# Patient Record
Sex: FEMALE | Race: WHITE | NOT HISPANIC OR LATINO | Employment: UNEMPLOYED | URBAN - METROPOLITAN AREA
[De-identification: names, ages, dates, MRNs, and addresses within clinical notes are randomized per-mention and may not be internally consistent; named-entity substitution may affect disease eponyms.]

---

## 2020-10-21 ENCOUNTER — HOSPITAL ENCOUNTER (EMERGENCY)
Facility: HOSPITAL | Age: 35
Discharge: HOME | End: 2020-10-21
Attending: EMERGENCY MEDICINE
Payer: MEDICAID

## 2020-10-21 VITALS
DIASTOLIC BLOOD PRESSURE: 77 MMHG | TEMPERATURE: 98 F | SYSTOLIC BLOOD PRESSURE: 143 MMHG | BODY MASS INDEX: 39.68 KG/M2 | OXYGEN SATURATION: 98 % | RESPIRATION RATE: 18 BRPM | WEIGHT: 293 LBS | HEIGHT: 72 IN | HEART RATE: 91 BPM
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
## 2020-10-21 DIAGNOSIS — S39.012A STRAIN OF LUMBAR REGION, INITIAL ENCOUNTER: ICD-10-CM

## 2020-10-21 PROCEDURE — 99284 EMERGENCY DEPT VISIT MOD MDM: CPT

## 2020-10-21 RX ORDER — METHOCARBAMOL 500 MG/1
1000 TABLET, FILM COATED ORAL 3 TIMES DAILY
Qty: 30 TABLET | Refills: 0 | Status: SHIPPED | OUTPATIENT
Start: 2020-10-21 | End: 2020-10-26

## 2020-10-21 RX ORDER — DICLOFENAC SODIUM 50 MG/1
50 TABLET, DELAYED RELEASE ORAL 2 TIMES DAILY
Qty: 20 TABLET | Refills: 0 | Status: SHIPPED | OUTPATIENT
Start: 2020-10-21 | End: 2023-10-02

## 2020-10-21 NOTE — Clinical Note
"Violet"Chilo Martinez was seen and treated in our emergency department on 10/21/2020.  She may return to work on 10/23/2020.       If you have any questions or concerns, please don't hesitate to call.      Viet Ocampo Jr., FNP"
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
## 2022-01-04 ENCOUNTER — PATIENT OUTREACH (OUTPATIENT)
Dept: ADMINISTRATIVE | Facility: HOSPITAL | Age: 37
End: 2022-01-04
Payer: MEDICAID

## 2023-09-11 ENCOUNTER — OFFICE VISIT (OUTPATIENT)
Dept: PRIMARY CARE CLINIC | Facility: CLINIC | Age: 38
End: 2023-09-11
Payer: MEDICAID

## 2023-09-11 ENCOUNTER — LAB VISIT (OUTPATIENT)
Dept: LAB | Facility: HOSPITAL | Age: 38
End: 2023-09-11
Attending: NURSE PRACTITIONER
Payer: MEDICAID

## 2023-09-11 VITALS
BODY MASS INDEX: 42.33 KG/M2 | SYSTOLIC BLOOD PRESSURE: 124 MMHG | HEART RATE: 85 BPM | WEIGHT: 263.38 LBS | HEIGHT: 66 IN | DIASTOLIC BLOOD PRESSURE: 78 MMHG | TEMPERATURE: 98 F | OXYGEN SATURATION: 98 %

## 2023-09-11 DIAGNOSIS — Z78.9 PROFOUND INATTENTION: ICD-10-CM

## 2023-09-11 DIAGNOSIS — Z13.220 ENCOUNTER FOR LIPID SCREENING FOR CARDIOVASCULAR DISEASE: ICD-10-CM

## 2023-09-11 DIAGNOSIS — Z86.39 HISTORY OF METABOLIC AND NUTRITIONAL DISORDER: ICD-10-CM

## 2023-09-11 DIAGNOSIS — R21 SKIN ERUPTION: Primary | ICD-10-CM

## 2023-09-11 DIAGNOSIS — Z13.6 ENCOUNTER FOR LIPID SCREENING FOR CARDIOVASCULAR DISEASE: ICD-10-CM

## 2023-09-11 DIAGNOSIS — Z11.4 SCREENING FOR HIV (HUMAN IMMUNODEFICIENCY VIRUS): ICD-10-CM

## 2023-09-11 DIAGNOSIS — Z11.59 ENCOUNTER FOR HEPATITIS C SCREENING TEST FOR LOW RISK PATIENT: ICD-10-CM

## 2023-09-11 DIAGNOSIS — Z00.00 GENERAL MEDICAL EXAM: ICD-10-CM

## 2023-09-11 DIAGNOSIS — Z12.4 SCREENING FOR CERVICAL CANCER: ICD-10-CM

## 2023-09-11 LAB
ALBUMIN SERPL BCP-MCNC: 3.7 G/DL (ref 3.5–5.2)
ALP SERPL-CCNC: 57 U/L (ref 55–135)
ALT SERPL W/O P-5'-P-CCNC: 8 U/L (ref 10–44)
ANION GAP SERPL CALC-SCNC: 12 MMOL/L (ref 8–16)
AST SERPL-CCNC: 13 U/L (ref 10–40)
BASOPHILS # BLD AUTO: 0.05 K/UL (ref 0–0.2)
BASOPHILS NFR BLD: 0.4 % (ref 0–1.9)
BILIRUB SERPL-MCNC: 0.6 MG/DL (ref 0.1–1)
BUN SERPL-MCNC: 10 MG/DL (ref 6–20)
CALCIUM SERPL-MCNC: 9.5 MG/DL (ref 8.7–10.5)
CHLORIDE SERPL-SCNC: 108 MMOL/L (ref 95–110)
CHOLEST SERPL-MCNC: 187 MG/DL (ref 120–199)
CHOLEST/HDLC SERPL: 4.1 {RATIO} (ref 2–5)
CO2 SERPL-SCNC: 18 MMOL/L (ref 23–29)
CREAT SERPL-MCNC: 0.7 MG/DL (ref 0.5–1.4)
DIFFERENTIAL METHOD: ABNORMAL
EOSINOPHIL # BLD AUTO: 0.1 K/UL (ref 0–0.5)
EOSINOPHIL NFR BLD: 1 % (ref 0–8)
ERYTHROCYTE [DISTWIDTH] IN BLOOD BY AUTOMATED COUNT: 15.9 % (ref 11.5–14.5)
EST. GFR  (NO RACE VARIABLE): >60 ML/MIN/1.73 M^2
ESTIMATED AVG GLUCOSE: 100 MG/DL (ref 68–131)
GLUCOSE SERPL-MCNC: 76 MG/DL (ref 70–110)
HBA1C MFR BLD: 5.1 % (ref 4–5.6)
HCT VFR BLD AUTO: 38.9 % (ref 37–48.5)
HCV AB SERPL QL IA: NORMAL
HDLC SERPL-MCNC: 46 MG/DL (ref 40–75)
HDLC SERPL: 24.6 % (ref 20–50)
HGB BLD-MCNC: 12.5 G/DL (ref 12–16)
HIV 1+2 AB+HIV1 P24 AG SERPL QL IA: NORMAL
IMM GRANULOCYTES # BLD AUTO: 0.03 K/UL (ref 0–0.04)
IMM GRANULOCYTES NFR BLD AUTO: 0.2 % (ref 0–0.5)
LDLC SERPL CALC-MCNC: 125.6 MG/DL (ref 63–159)
LYMPHOCYTES # BLD AUTO: 3.6 K/UL (ref 1–4.8)
LYMPHOCYTES NFR BLD: 29.1 % (ref 18–48)
MCH RBC QN AUTO: 27.1 PG (ref 27–31)
MCHC RBC AUTO-ENTMCNC: 32.1 G/DL (ref 32–36)
MCV RBC AUTO: 84 FL (ref 82–98)
MONOCYTES # BLD AUTO: 1 K/UL (ref 0.3–1)
MONOCYTES NFR BLD: 8.3 % (ref 4–15)
NEUTROPHILS # BLD AUTO: 7.6 K/UL (ref 1.8–7.7)
NEUTROPHILS NFR BLD: 61 % (ref 38–73)
NONHDLC SERPL-MCNC: 141 MG/DL
NRBC BLD-RTO: 0 /100 WBC
PLATELET # BLD AUTO: 294 K/UL (ref 150–450)
PMV BLD AUTO: 10.9 FL (ref 9.2–12.9)
POTASSIUM SERPL-SCNC: 3.6 MMOL/L (ref 3.5–5.1)
PROT SERPL-MCNC: 8.1 G/DL (ref 6–8.4)
RBC # BLD AUTO: 4.62 M/UL (ref 4–5.4)
SODIUM SERPL-SCNC: 138 MMOL/L (ref 136–145)
T3FREE SERPL-MCNC: 3 PG/ML (ref 2.3–4.2)
T4 FREE SERPL-MCNC: 0.96 NG/DL (ref 0.71–1.51)
TRIGL SERPL-MCNC: 77 MG/DL (ref 30–150)
TSH SERPL DL<=0.005 MIU/L-ACNC: 3.12 UIU/ML (ref 0.4–4)
WBC # BLD AUTO: 12.38 K/UL (ref 3.9–12.7)

## 2023-09-11 PROCEDURE — 84443 ASSAY THYROID STIM HORMONE: CPT | Performed by: NURSE PRACTITIONER

## 2023-09-11 PROCEDURE — 3044F PR MOST RECENT HEMOGLOBIN A1C LEVEL <7.0%: ICD-10-PCS | Mod: CPTII,,, | Performed by: NURSE PRACTITIONER

## 2023-09-11 PROCEDURE — 3074F SYST BP LT 130 MM HG: CPT | Mod: CPTII,,, | Performed by: NURSE PRACTITIONER

## 2023-09-11 PROCEDURE — 1159F MED LIST DOCD IN RCRD: CPT | Mod: CPTII,,, | Performed by: NURSE PRACTITIONER

## 2023-09-11 PROCEDURE — 86803 HEPATITIS C AB TEST: CPT | Performed by: NURSE PRACTITIONER

## 2023-09-11 PROCEDURE — 3008F PR BODY MASS INDEX (BMI) DOCUMENTED: ICD-10-PCS | Mod: CPTII,,, | Performed by: NURSE PRACTITIONER

## 2023-09-11 PROCEDURE — 3074F PR MOST RECENT SYSTOLIC BLOOD PRESSURE < 130 MM HG: ICD-10-PCS | Mod: CPTII,,, | Performed by: NURSE PRACTITIONER

## 2023-09-11 PROCEDURE — 99214 OFFICE O/P EST MOD 30 MIN: CPT | Mod: PBBFAC,PN | Performed by: NURSE PRACTITIONER

## 2023-09-11 PROCEDURE — 3078F PR MOST RECENT DIASTOLIC BLOOD PRESSURE < 80 MM HG: ICD-10-PCS | Mod: CPTII,,, | Performed by: NURSE PRACTITIONER

## 2023-09-11 PROCEDURE — 84439 ASSAY OF FREE THYROXINE: CPT | Performed by: NURSE PRACTITIONER

## 2023-09-11 PROCEDURE — 3078F DIAST BP <80 MM HG: CPT | Mod: CPTII,,, | Performed by: NURSE PRACTITIONER

## 2023-09-11 PROCEDURE — 1159F PR MEDICATION LIST DOCUMENTED IN MEDICAL RECORD: ICD-10-PCS | Mod: CPTII,,, | Performed by: NURSE PRACTITIONER

## 2023-09-11 PROCEDURE — 80053 COMPREHEN METABOLIC PANEL: CPT | Performed by: NURSE PRACTITIONER

## 2023-09-11 PROCEDURE — 85025 COMPLETE CBC W/AUTO DIFF WBC: CPT | Performed by: NURSE PRACTITIONER

## 2023-09-11 PROCEDURE — 84481 FREE ASSAY (FT-3): CPT | Performed by: NURSE PRACTITIONER

## 2023-09-11 PROCEDURE — 80061 LIPID PANEL: CPT | Performed by: NURSE PRACTITIONER

## 2023-09-11 PROCEDURE — 1160F RVW MEDS BY RX/DR IN RCRD: CPT | Mod: CPTII,,, | Performed by: NURSE PRACTITIONER

## 2023-09-11 PROCEDURE — 3044F HG A1C LEVEL LT 7.0%: CPT | Mod: CPTII,,, | Performed by: NURSE PRACTITIONER

## 2023-09-11 PROCEDURE — 87389 HIV-1 AG W/HIV-1&-2 AB AG IA: CPT | Performed by: NURSE PRACTITIONER

## 2023-09-11 PROCEDURE — 99203 PR OFFICE/OUTPT VISIT, NEW, LEVL III, 30-44 MIN: ICD-10-PCS | Mod: S$PBB,,, | Performed by: NURSE PRACTITIONER

## 2023-09-11 PROCEDURE — 83036 HEMOGLOBIN GLYCOSYLATED A1C: CPT | Performed by: NURSE PRACTITIONER

## 2023-09-11 PROCEDURE — 36415 COLL VENOUS BLD VENIPUNCTURE: CPT | Mod: PN | Performed by: NURSE PRACTITIONER

## 2023-09-11 PROCEDURE — 1160F PR REVIEW ALL MEDS BY PRESCRIBER/CLIN PHARMACIST DOCUMENTED: ICD-10-PCS | Mod: CPTII,,, | Performed by: NURSE PRACTITIONER

## 2023-09-11 PROCEDURE — 99999 PR PBB SHADOW E&M-EST. PATIENT-LVL IV: CPT | Mod: PBBFAC,,, | Performed by: NURSE PRACTITIONER

## 2023-09-11 PROCEDURE — 3008F BODY MASS INDEX DOCD: CPT | Mod: CPTII,,, | Performed by: NURSE PRACTITIONER

## 2023-09-11 PROCEDURE — 99999 PR PBB SHADOW E&M-EST. PATIENT-LVL IV: ICD-10-PCS | Mod: PBBFAC,,, | Performed by: NURSE PRACTITIONER

## 2023-09-11 PROCEDURE — 99203 OFFICE O/P NEW LOW 30 MIN: CPT | Mod: S$PBB,,, | Performed by: NURSE PRACTITIONER

## 2023-09-11 NOTE — PROGRESS NOTES
Subjective:       Patient ID: Violet Martinez is a 37 y.o. female.    Chief Complaint: Annual Exam and Establish Care      History of Present Illness:   Violet Martinez 37 y.o. female presents today with reports of skin eruption to her legs and address care gaps. Patient denies any other problems or concerns at this time. Treatment options and alternatives were discussed with the patient. Patient provided opportunity to ask additional questions.  All questions were answered. Voices understanding and acceptance of this advice. Instructed to call back if any further questions or concerns.      History reviewed. No pertinent past medical history.  Family History   Problem Relation Age of Onset    Hypertension Mother      Social History     Socioeconomic History    Marital status: Single   Tobacco Use    Smoking status: Never    Smokeless tobacco: Current    Tobacco comments:     vape   Substance and Sexual Activity    Alcohol use: Yes    Drug use: Yes     Types: Marijuana    Sexual activity: Yes     Outpatient Encounter Medications as of 9/11/2023   Medication Sig Dispense Refill    diclofenac (VOLTAREN) 50 MG EC tablet Take 1 tablet (50 mg total) by mouth 2 (two) times daily. (Patient not taking: Reported on 9/11/2023) 20 tablet 0     No facility-administered encounter medications on file as of 9/11/2023.       Review of Systems   Constitutional:  Negative for appetite change, chills and fever.   HENT:  Negative for ear pain, sinus pressure, sore throat and trouble swallowing.    Eyes:  Negative for visual disturbance.   Respiratory:  Negative for shortness of breath.    Cardiovascular:  Negative for chest pain.   Gastrointestinal:  Negative for abdominal pain, diarrhea, nausea and vomiting.   Endocrine: Negative for cold intolerance, polyphagia and polyuria.   Genitourinary:  Negative for decreased urine volume and dysuria.   Musculoskeletal:  Negative for back pain.   Skin:  Negative for rash.  "  Allergic/Immunologic: Negative for environmental allergies and food allergies.   Neurological:  Negative for dizziness, tremors, weakness and numbness.   Hematological:  Does not bruise/bleed easily.   Psychiatric/Behavioral:  Negative for confusion and hallucinations. The patient is not nervous/anxious and is not hyperactive.    All other systems reviewed and are negative.      Objective:      /78 (BP Location: Left arm, Patient Position: Sitting, BP Method: Large (Manual))   Pulse 85   Temp 98.3 °F (36.8 °C) (Oral)   Ht 5' 6" (1.676 m)   Wt 119.5 kg (263 lb 6.4 oz)   LMP 08/22/2023   SpO2 98%   BMI 42.51 kg/m²   Physical Exam  Vitals and nursing note reviewed.   Constitutional:       Appearance: She is well-developed.   HENT:      Head: Normocephalic and atraumatic.      Right Ear: External ear normal.      Left Ear: External ear normal.      Nose: Nose normal.   Eyes:      Conjunctiva/sclera: Conjunctivae normal.      Pupils: Pupils are equal, round, and reactive to light.   Cardiovascular:      Rate and Rhythm: Normal rate and regular rhythm.      Heart sounds: Normal heart sounds. No murmur heard.  Pulmonary:      Effort: Pulmonary effort is normal.      Breath sounds: Normal breath sounds. No wheezing.   Abdominal:      General: Bowel sounds are normal.      Palpations: Abdomen is soft.      Tenderness: There is no abdominal tenderness.   Musculoskeletal:         General: Normal range of motion.      Cervical back: Normal range of motion and neck supple.   Skin:     General: Skin is warm and dry.      Findings: No rash.             Comments: See images    Neurological:      Mental Status: She is alert and oriented to person, place, and time.      Deep Tendon Reflexes: Reflexes are normal and symmetric.   Psychiatric:         Behavior: Behavior normal.         Thought Content: Thought content normal.         Judgment: Judgment normal.               No results found for this or any previous " visit.  Assessment:       1. Skin eruption    2. General medical exam    3. Profound inattention    4. Screening for HIV (human immunodeficiency virus)    5. Screening for cervical cancer    6. History of metabolic and nutritional disorder    7. Encounter for hepatitis C screening test for low risk patient    8. Encounter for lipid screening for cardiovascular disease        Plan:   Violet was seen today for annual exam and establish care.    Diagnoses and all orders for this visit:    Skin eruption    General medical exam  -     CBC Auto Differential; Future  -     Comprehensive Metabolic Panel; Future    Profound inattention    Screening for HIV (human immunodeficiency virus)  -     HIV 1/2 Ag/Ab (4th Gen); Future    Screening for cervical cancer  -     Ambulatory referral/consult to Gynecology; Future    History of metabolic and nutritional disorder  -     Hemoglobin A1C; Future  -     TSH; Future  -     T3, Free; Future  -     T4, Free; Future    Encounter for hepatitis C screening test for low risk patient  -     Hepatitis C Antibody; Future    Encounter for lipid screening for cardiovascular disease  -     Lipid Panel; Future              [facility] [place]   [address] [place] [address] [address]  [place] [address]  Office [phone]  Fax [phone]      Yes

## 2023-09-13 ENCOUNTER — PATIENT MESSAGE (OUTPATIENT)
Dept: PRIMARY CARE CLINIC | Facility: CLINIC | Age: 38
End: 2023-09-13
Payer: MEDICAID

## 2023-09-15 ENCOUNTER — OFFICE VISIT (OUTPATIENT)
Dept: PRIMARY CARE CLINIC | Facility: CLINIC | Age: 38
End: 2023-09-15
Payer: MEDICAID

## 2023-09-15 DIAGNOSIS — G47.30 SLEEP APNEA, UNSPECIFIED TYPE: Primary | ICD-10-CM

## 2023-09-15 PROCEDURE — 3044F HG A1C LEVEL LT 7.0%: CPT | Mod: CPTII,95,, | Performed by: NURSE PRACTITIONER

## 2023-09-15 PROCEDURE — 99213 PR OFFICE/OUTPT VISIT, EST, LEVL III, 20-29 MIN: ICD-10-PCS | Mod: 95,,, | Performed by: NURSE PRACTITIONER

## 2023-09-15 PROCEDURE — 99213 OFFICE O/P EST LOW 20 MIN: CPT | Mod: 95,,, | Performed by: NURSE PRACTITIONER

## 2023-09-15 PROCEDURE — 3044F PR MOST RECENT HEMOGLOBIN A1C LEVEL <7.0%: ICD-10-PCS | Mod: CPTII,95,, | Performed by: NURSE PRACTITIONER

## 2023-09-15 PROCEDURE — 1159F PR MEDICATION LIST DOCUMENTED IN MEDICAL RECORD: ICD-10-PCS | Mod: CPTII,95,, | Performed by: NURSE PRACTITIONER

## 2023-09-15 PROCEDURE — 1159F MED LIST DOCD IN RCRD: CPT | Mod: CPTII,95,, | Performed by: NURSE PRACTITIONER

## 2023-09-15 PROCEDURE — 1160F PR REVIEW ALL MEDS BY PRESCRIBER/CLIN PHARMACIST DOCUMENTED: ICD-10-PCS | Mod: CPTII,95,, | Performed by: NURSE PRACTITIONER

## 2023-09-15 PROCEDURE — 1160F RVW MEDS BY RX/DR IN RCRD: CPT | Mod: CPTII,95,, | Performed by: NURSE PRACTITIONER
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
## 2023-09-21 ENCOUNTER — PATIENT MESSAGE (OUTPATIENT)
Dept: PRIMARY CARE CLINIC | Facility: CLINIC | Age: 38
End: 2023-09-21
Payer: MEDICAID

## 2023-09-28 ENCOUNTER — PATIENT MESSAGE (OUTPATIENT)
Dept: PULMONOLOGY | Facility: CLINIC | Age: 38
End: 2023-09-28
Payer: MEDICAID

## 2023-10-02 ENCOUNTER — OFFICE VISIT (OUTPATIENT)
Dept: PULMONOLOGY | Facility: CLINIC | Age: 38
End: 2023-10-02
Payer: MEDICAID

## 2023-10-02 ENCOUNTER — PATIENT MESSAGE (OUTPATIENT)
Dept: PULMONOLOGY | Facility: CLINIC | Age: 38
End: 2023-10-02

## 2023-10-02 VITALS — WEIGHT: 260 LBS | BODY MASS INDEX: 41.78 KG/M2 | HEIGHT: 66 IN

## 2023-10-02 DIAGNOSIS — G47.33 OSA (OBSTRUCTIVE SLEEP APNEA): Primary | ICD-10-CM

## 2023-10-02 DIAGNOSIS — G47.19 EXCESSIVE DAYTIME SLEEPINESS: ICD-10-CM

## 2023-10-02 DIAGNOSIS — I10 PRIMARY HYPERTENSION: ICD-10-CM

## 2023-10-02 DIAGNOSIS — G47.30 SLEEP APNEA, UNSPECIFIED TYPE: ICD-10-CM

## 2023-10-02 PROCEDURE — 1160F RVW MEDS BY RX/DR IN RCRD: CPT | Mod: CPTII,95,, | Performed by: INTERNAL MEDICINE

## 2023-10-02 PROCEDURE — 3008F PR BODY MASS INDEX (BMI) DOCUMENTED: ICD-10-PCS | Mod: CPTII,95,, | Performed by: INTERNAL MEDICINE

## 2023-10-02 PROCEDURE — 1159F PR MEDICATION LIST DOCUMENTED IN MEDICAL RECORD: ICD-10-PCS | Mod: CPTII,95,, | Performed by: INTERNAL MEDICINE

## 2023-10-02 PROCEDURE — 1160F PR REVIEW ALL MEDS BY PRESCRIBER/CLIN PHARMACIST DOCUMENTED: ICD-10-PCS | Mod: CPTII,95,, | Performed by: INTERNAL MEDICINE

## 2023-10-02 PROCEDURE — 99203 PR OFFICE/OUTPT VISIT, NEW, LEVL III, 30-44 MIN: ICD-10-PCS | Mod: 95,,, | Performed by: INTERNAL MEDICINE

## 2023-10-02 PROCEDURE — 99203 OFFICE O/P NEW LOW 30 MIN: CPT | Mod: 95,,, | Performed by: INTERNAL MEDICINE

## 2023-10-02 PROCEDURE — 3008F BODY MASS INDEX DOCD: CPT | Mod: CPTII,95,, | Performed by: INTERNAL MEDICINE

## 2023-10-02 PROCEDURE — 3044F HG A1C LEVEL LT 7.0%: CPT | Mod: CPTII,95,, | Performed by: INTERNAL MEDICINE

## 2023-10-02 PROCEDURE — 3044F PR MOST RECENT HEMOGLOBIN A1C LEVEL <7.0%: ICD-10-PCS | Mod: CPTII,95,, | Performed by: INTERNAL MEDICINE

## 2023-10-02 PROCEDURE — 1159F MED LIST DOCD IN RCRD: CPT | Mod: CPTII,95,, | Performed by: INTERNAL MEDICINE

## 2023-10-02 NOTE — PATIENT INSTRUCTIONS
Your provider has scheduled you for a sleep study.   You should be receiving a phone call from the sleep lab shortly after your study has been approved by your insurance. Please make sure you have your current phone numbers in the [email or web address] system. If you do not hear from anyone in the next 10 business days, please call the sleep lab at [phone] to schedule your sleep study. The sleep studies are performed at [facility] seven nights a week.  When you are scheduling your sleep study, they will also make you a follow up appointment with your provider. This follow up appointment will be 10-14 days after your sleep study to review the results. If it is noted that you do have sleep apnea on your initial sleep study, you may receive a call back for a second night study with the CPAP before you come back to the office.

## 2023-10-02 NOTE — ASSESSMENT & PLAN NOTE
Known JHON since 20 years old  CPAP has failure: mechanical breakdown  Irregular bed and wake time    INLAB PSG die to poor schedule

## 2023-10-02 NOTE — ASSESSMENT & PLAN NOTE
Patient would benefit from weight loss and has tried to set realistic goals to achieve success. Lifestyle changes were discussed on eating healthy, exercising at least 150 minutes weekly, and reducing sedentary behavior.   Discussed the risk factors associated with obesity: Arthritis/JHON/Diabetes/Fatty Liver/Cardiovascular disease/GERD/HTN/HLP.

## 2023-10-02 NOTE — PROGRESS NOTES
The patient location is: [facility]  The chief complaint leading to consultation is: Sleep issues    Visit type: audiovisual    Face to Face time with patient: 8.22min  45 minutes of total time spent on the encounter, which includes face to face time and non-face to face time preparing to see the patient (eg, review of tests), Obtaining and/or reviewing separately obtained history, Documenting clinical information in the electronic or other health record, Independently interpreting results (not separately reported) and communicating results to the patient/family/caregiver, or Care coordination (not separately reported).         Each patient to whom he or she provides medical services by telemedicine is:  (1) informed of the relationship between the physician and patient and the respective role of any other health care provider with respect to management of the patient; and (2) notified that he or she may decline to receive medical services by telemedicine and may withdraw from such care at any time.    Notes:                                           Pulmonary Outpatient  Visit     Subjective:       Patient ID: Violet Martinez is a 37 y.o. female.    Social History     Tobacco Use   Smoking Status Never   Smokeless Tobacco Current   Tobacco Comments    vape            Chief Complaint: Apnea      Violet Martinez is 37 y.o.  New patient  Referred by Janette Mccall PA  Known JHON  Has CPAP: broken since 3 weeks  Duration of therapy: had since 20 years old  Test was done at [facility]:  DME[facility]  CPAP ? 20 , Nasal mask  Occupation: unemployed  Bed time: 10 pm  Wake time: 5 am  Sleep poorly  Daytime sleepiness and naps  Sleeping Pills: melatonin  SmokingVAPING            Violet Martinez 37 y.o. female presents today with reports of fatigue, and is requesting us new sleep study so that she can obtain a new sleep apnea machine.  According to patient her is broke a while back and she starting to  have more symptoms that are becoming worse.     STOP-BANG Score for Obstructive Sleep Apnea from [employer]  on 2023  ** All calculations should be rechecked by clinician prior to use **     RESULT SUMMARY:  4 points  STOP-BANG     Intermediate   Risk for moderate to severe JHON                     Review of Systems   Constitutional:  Positive for weight gain and fatigue.   Respiratory:  Positive for apnea, snoring, asthma nighttime symptoms and somnolence.    Psychiatric/Behavioral:  Positive for sleep disturbance.    All other systems reviewed and are negative.      Outpatient Encounter Medications as of 10/2/2023   Medication Sig Dispense Refill    [DISCONTINUED] diclofenac (VOLTAREN) 50 MG EC tablet Take 1 tablet (50 mg total) by mouth 2 (two) times daily. (Patient not taking: Reported on 2023) 20 tablet 0     No facility-administered encounter medications on file as of 10/2/2023.       The following portions of the patient's history were reviewed and updated as appropriate: She  has no past medical history on file.  She does not have any pertinent problems on file.  She  has a past surgical history that includes  section and Tubal ligation.  Her family history includes Hypertension in her mother.  She  reports that she has never smoked. She uses smokeless tobacco. She reports current alcohol use. She reports current drug use. Drug: Marijuana.  She currently has no medications in their medication list.  Current Outpatient Medications on File Prior to Visit   Medication Sig Dispense Refill    [DISCONTINUED] diclofenac (VOLTAREN) 50 MG EC tablet Take 1 tablet (50 mg total) by mouth 2 (two) times daily. (Patient not taking: Reported on 2023) 20 tablet 0     No current facility-administered medications on file prior to visit.     She has No Known Allergies..      BP Readings from Last 3 Encounters:   23 124/78   10/21/20 (!) 143/77     Snoring / Sleep:     Christine Questionnaire (validated  "JHON screening questionnaire)    YES -- Snoring/apnea    YES -- Fatigue    Body mass index is 41.97 kg/m².  (>25 is overweight, >30 is obese)    Blood Pressure = Hypertension  (PreHTN 120-139/80-89, Stg1 140-159/90-99, Stg2 >160/>100)  [place] = 3 of three JHON categories are positive (high risk is 2-3 positive categories)     [place] Sleepiness Scale TOTAL =        10/2/2023     8:22 AM   EPWORTH SLEEPINESS SCALE   Sitting and reading 3   Watching TV 3   Sitting, inactive in a public place (e.g. a theatre or a meeting) 3   As a passenger in a car for an hour without a break 3   Lying down to rest in the afternoon when circumstances permit 3   Sitting and talking to someone 3   Sitting quietly after a lunch without alcohol 3   In a car, while stopped for a few minutes in traffic 3   Total score 24      (validated sleepiness questionnaire with a higher score indicating greater sleepiness; range 0-24)  No flowsheet data found.    STOP-Bang Questionnaire (validated JHON screening questionnaire)  Negative unless checked off.  [x] Snoring    [x]  Tired/Fatigued/Sleepy  [x] Obstruction (apneas/choking)  [x] Pressure (HTN)  [x] BMI >35  [] Age >50  [] Neck >40 cm  [] Gender male   STOP-Bang = 5 (low risk 0-2,high risk 3-8)           MMRC Dyspnea Scale (4 is worst)     [x] MMRC 0: Dyspneic on strenuous excercise (0 points)    [] MMRC 1: Dyspneic on walking a slight hill (0 points)    [] MMRC 2: Dyspneic on walking level ground; must stop occasionally due to breathlessness (1 point)    [] MMRC 3: Must stop for breathlessness after walking 100 yards or after a few minutes (2 points)    [] MMRC 4: Cannot leave house; breathless on dressing/undressing (3 points)                          No data to display                          Objective:     Vital Signs (Most Recent)  Height and Weight  Height: 5' 6" (167.6 cm)  Weight: 117.9 kg (260 lb)  BSA (Calculated - sq m): 2.34 sq meters  BMI (Calculated): 42  Weight in (lb) to have BMI = " "25: 154.6]  Wt Readings from Last 2 Encounters:   10/02/23 117.9 kg (260 lb)   09/11/23 119.5 kg (263 lb 6.4 oz)       Physical Exam  Vitals and nursing note reviewed.   Constitutional:       Appearance: She is obese.   HENT:      Head: Normocephalic.   Eyes:      Pupils: Pupils are equal, round, and reactive to light.   Neurological:      General: No focal deficit present.      Mental Status: She is alert.          Laboratory  Lab Results   Component Value Date    WBC 12.38 09/11/2023    RBC 4.62 09/11/2023    HGB 12.5 09/11/2023    HCT 38.9 09/11/2023    MCV 84 09/11/2023    MCH 27.1 09/11/2023    MCHC 32.1 09/11/2023    RDW 15.9 (H) 09/11/2023     09/11/2023    MPV 10.9 09/11/2023    GRAN 7.6 09/11/2023    GRAN 61.0 09/11/2023    LYMPH 3.6 09/11/2023    LYMPH 29.1 09/11/2023    MONO 1.0 09/11/2023    MONO 8.3 09/11/2023    EOS 0.1 09/11/2023    BASO 0.05 09/11/2023    EOSINOPHIL 1.0 09/11/2023    BASOPHIL 0.4 09/11/2023       BMP  Lab Results   Component Value Date     09/11/2023    K 3.6 09/11/2023     09/11/2023    CO2 18 (L) 09/11/2023    BUN 10 09/11/2023    CREATININE 0.7 09/11/2023    CALCIUM 9.5 09/11/2023    ANIONGAP 12 09/11/2023    AST 13 09/11/2023    ALT 8 (L) 09/11/2023    PROT 8.1 09/11/2023          No results found for: "IGE"     No results found for: "ASPERGILLUS"  No results found for: "AFUMIGATUSCL"     No results found for: "ACE"     Diagnostic Results:  I have personally reviewed today the following studies:    No image results found.       Assessment/Plan:     Problem List Items Addressed This Visit       Hypertension    JHON (obstructive sleep apnea) - Primary     Known JHON since 20 years old  CPAP has failure: mechanical breakdown  Irregular bed and wake time    INLAB PSG die to poor schedule         Relevant Orders    Polysomnogram (CPAP will be added if patient meets diagnostic criteria.)    BMI 40.0-44.9, adult     Patient would benefit from weight loss and has tried " "to set realistic goals to achieve success. Lifestyle changes were discussed on eating healthy, exercising at least 150 minutes weekly, and reducing sedentary behavior.   Discussed the risk factors associated with obesity: Arthritis/JHON/Diabetes/Fatty Liver/Cardiovascular disease/GERD/HTN/HLP.          Excessive daytime sleepiness     Sleep hygeine          Other Visit Diagnoses       Sleep apnea, unspecified type        Relevant Orders    Polysomnogram (CPAP will be added if patient meets diagnostic criteria.)             Complications of untreated sleep apnea discussed with pateint in detail including but not limited to  high blood pressure, heart attack, stroke, obesity,  diabetes and worsening heart failure. Patient voiced understanding.      Patient was educated about the symptoms and signs of drowsy driving and about effective countermeasures. Symptoms of drowsy driving include difficulty focusing, frequent blinking, heavy eyelids, daydreaming, wandering/disconnected thoughts, difficulty remembering the last few miles driven (sometimes called "automatic behavior") or missing exits and street signs, frequent yawning, rubbing eyes, difficulty keeping head up, drifting from shaka to shaka, tailgating or hitting a shoulder, and feeling restless and irritable .  Patient was made  aware that the ability to self-rate sleepiness and performance is unreliable . Although performance continues to decline with cumulative days of sleep deprivation, subjective ratings of sleepiness tend to level off after the first few days in laboratory conditions of sleep deprivation , and drivers are often unaware of their own level of sleepiness .  Rather than driving while drowsy, patient was told to use other modes of transportation, such as ride sharing, public transportation, taxis, or walking. Importantly, drivers should be advised to plan ahead so that they avoid driving during times of day when they are likely to be sleepy, such as " mid-afternoon, late at night, or after a period of sleep deprivation; to keep their trips short (under 20 minutes); and to use alternative modes of transportation.  Patient was warned about the risk of driving while drowsy.  Patient was instructed that patients who are considered high-risk (eg, those with excessive daytime sleepiness and a history of a drowsy driving crash or near miss) should be warned not to drive until therapy has been instituted and proven effective.      Follow up in about 4 weeks (around 10/30/2023), or sleep diary, weight loss, PSG.    This note was prepared using voice recognition system and is likely to have sound alike errors that may have been overlooked even after proof reading.  Please call me with any questions    Discussed diagnosis, its evaluation, treatment and usual course. All questions answered.    Thank you for the courtesy of participating in the care of this patient    Donnie Llanes MD      Personal Diagnostic Review  []  CXR    []  ECHO    []  ONSAT    []  6MWD    []  LABS    []  CHEST CT    []  PET CT    []  Biopsy results

## 2023-10-04 ENCOUNTER — HOSPITAL ENCOUNTER (OUTPATIENT)
Dept: SLEEP MEDICINE | Facility: HOSPITAL | Age: 38
Discharge: HOME | End: 2023-10-04
Attending: INTERNAL MEDICINE
Payer: MEDICAID

## 2023-10-04 DIAGNOSIS — G47.33 OSA (OBSTRUCTIVE SLEEP APNEA): ICD-10-CM

## 2023-10-04 DIAGNOSIS — G47.30 SLEEP APNEA, UNSPECIFIED TYPE: ICD-10-CM

## 2023-10-04 PROCEDURE — 95811 POLYSOM 6/>YRS CPAP 4/> PARM: CPT

## 2023-10-04 NOTE — Clinical Note
Diagnosis: Obstructive Sleep Apnea / 327.23   PLMD  Recommendations:  1. Therapy with CPAP 10.0 cmwp with mask of choice 2. Weight loss

## 2023-10-09 ENCOUNTER — PATIENT MESSAGE (OUTPATIENT)
Dept: ADMINISTRATIVE | Facility: OTHER | Age: 38
End: 2023-10-09
Payer: MEDICAID

## 2023-10-11 PROCEDURE — 95811 POLYSOM 6/>YRS CPAP 4/> PARM: CPT | Mod: 26,,, | Performed by: INTERNAL MEDICINE

## 2023-10-11 PROCEDURE — 95811 PR POLYSOMNOGRAPHY W/CPAP: ICD-10-PCS | Mod: 26,,, | Performed by: INTERNAL MEDICINE

## 2023-10-11 NOTE — PROCEDURES
"Patient Name: Violet Martinez Study Date: 10/4/2023   YOB: 1985 Study Type: SPLIT   Age: 37 year Patient #: [id number]   Sex: Female Billing ID: -   Height: 6' 6" Interpreting Physician: Sha Fuller PhD   Weight: 260.0 lbs Recording Tech: Jay Lam   BMI: 30.1 Scoring Tech: Aruna Chavezpaulo   [place]: 20 Neck Circumference: 15     Indications for Polysomnography  The patient is a 37 year old Female who is 6' 6" and weighs 260.0 lbs.  Her BMI equals 30.1.  A diagnostic polysomnogram was performed to evaluate for -. Known JHON   After 116.5 minutes of sleep time the patient exhibited sufficient respiratory events qualifying her for a CPAP trial which was then initiated.      Referring Provider Donnie Llanes MD    Medical History: Known JHON  Has CPAP: broken since 3 weeks  Duration of therapy: had since 20 years old  Test was done at [facility]:  D[facility]  CPAP ? 20 , Nasal mask  Occupation: unemployed  Bed time: 10 pm  Wake time: 5 am  Sleep poorly  Daytime sleepiness and naps  Sleeping Pills: melatonin  STOPBANG score 4      Medication   Melatonin     Test Description  Patient was connected to a full set of leads with a sleep technician in attendance.  Parameters used were EEG derivations (F4-A1, C4-A1, O2-A1), EOG derivations (LOC-A2, MARIXA-A2), EKG, EMG - extremity (leg) & chin, oxygen saturation, effort parameters + abdominal/thoracic (RIP), and airflow parameters - nasal pressure/thermal.  Body position was visually monitored and noted.  Audio & video monitoring was performed during study.    Scoring is done in accordance with the American Academy of Sleep Medicine Manual for the Scoring of Sleep and Associated Events version 2.6.  Hypopneas are scored using the 4% desaturation rule.    Sleep Architecture  The total recording time of the diagnostic portion of the study was 132.9 minutes.  The total sleep time was 116.5 minutes.  During the diagnostic portion of the study, the patient spent " 3.4% of total sleep time in Stage N1, 84.5% in Stage N2, 0.0% in Stages N3, and 12.0% in REM.   Sleep latency was 13.4 minutes.  REM latency was 53.5 minutes.  Sleep Efficiency was 87.6%.  Wake after Sleep Onset time was 3.0 minutes.     At 12:03:50 AM the patient was placed on PAP treatment and was titrated at pressures ranging from 8* cm/H20 with supplemental oxygen at - up to 10* cm/H20 with supplemental oxygen at -.  The total recording time of the treatment portion of the study was 277.4 minutes.  The total sleep time was 274.5 minutes.  During the treatment portion of the study, the patient spent 2.4% of total sleep time in Stage N1, 59.4% in Stage N2, 10.7% in Stages N3, and 27.5% in REM.   Sleep latency was 1.0 minutes.  REM latency was 36.0 minutes.  Sleep Efficiency was 98.9%.  Wake after Sleep Onset time was 1.5 minutes.    Respiratory Events  During the diagnostic portion of the study, the polysomnogram revealed a presence of 31 obstructive, - central, and - mixed apneas resulting in an Apnea index of 16.0 events per hour.  There were 94 hypopneas (?3% desat and/or Ar.) resulting in an Apnea\Hypopnea Index (AHI ?3% desat and/or Ar.) of 64.4 events per hour.  There were 93 hypopneas (?4% desat) resulting in an Apnea\Hypopnea Index (AHI ?4% desat) of 63.9 events per hour.  There were - Respiratory Effort Related Arousals resulting in a RERA index of - events per hour. The Respiratory Disturbance Index is 64.4 events per hour.  Mean oxygen saturation was 91.7%.  The lowest oxygen saturation during sleep was 84.0%.  Time spent ?88% oxygen saturation was 22.0 minutes (16.6%).    During the treatment portion of the study, the polysomnogram revealed a presence of - obstructive, - central, and - mixed apneas resulting in an Apnea index of - events per hour.  There were 5 hypopneas (?3% desat and/or Ar.) resulting in an Apnea\Hypopnea Index (AHI ?3% desat and/or Ar.) of 1.1 events per hour.  There were 5  hypopneas (?4% desat) resulting in an Apnea\Hypopnea Index (AHI ?4% desat) of 1.1 events per hour.  There were - Respiratory Effort Related Arousals resulting in a RERA index of - events per hour. The Respiratory Disturbance Index is 1.1 events per hour.  Mean oxygen saturation was 95.7%.  The lowest oxygen saturation during sleep was 90.0%.  Time spent ?88% oxygen saturation was - minutes (-).    Limb Activity  During the diagnostic portion of the study, there were 13 limb movements recorded.  Of this total, 13 were classified as PLMs.  Of the PLMs, 3 were associated with arousals.  The Limb Movement index was 6.7 per hour while the PLM index was 6.7 per hour.    During the treatment portion of the study, there were - limb movements recorded.  Of this total, - were classified as PLMs.  Of the PLMs, - were associated with arousals.  The Limb Movement index was - per hour while the PLM index was - per hour.    Cardiac Summary  During the diagnostic portion of the study, the average pulse rate was 72.0 bpm.  The minimum pulse rate was 58.0 bpm while the maximum pulse rate was 96.0 bpm.    During the treatment portion of the study, the average pulse rate was 64.4 bpm.  The minimum pulse rate was 54.0 bpm while the maximum pulse rate was 89.0 bpm.     Behavioral observations    Conclusion:   EKG: Sinus rhythm  Diagnostic AHI was 63.9/hr ( severe obstructive sleep apnea) 93 events with SpO2 jeannette 84%  Met Split night criteria  CPAP titrated to  10.0cmwp with AHI 1.1/hr  Flex: 2  Mask & Size: AirFit P10 M Humidity: heated  Mild PLMD: Index 6.7/hr during diagnostic that were eliminated after PAP.    Diagnosis: Obstructive Sleep Apnea / 327.23    PLMD    Recommendations:   Therapy with CPAP 10.0 cmwp with mask of choice  Weight loss      Dear Donnie Llanes MD  [id number] [facility]  [place] [id number]/Janette Mccall PA         The sleep study that you ordered is complete.  You have ordered sleep LAB services  "to perform the sleep study for Violet Martinez .      Please find Sleep Study result in  the "Media tab" of Chart Review menu.        You can look  for the report in the  Media by the document type "Sleep Study Documents". Alphabetizing  "Document type" column helps to find the SLEEP STUDY report  Faster.       As the ordering provider, you are responsible for reviewing the results and implementing a treatment plan with your patient.    If you need a Sleep Medicine provider to explain the sleep study findings and arrange treatment for the patient, please refer patient for consultation to our Sleep Clinic via [facility] with Ambulatory Consult Sleep.     To do that please place an order for an  "Ambulatory Consult Sleep" -  order , it will go to our clinic work queue for our staff  to contact the patient for an appointment.      For any questions, please contact our sleep lab  staff at [phone] to talk to clinical staff          Donnie Llanes MD   "

## 2023-10-12 ENCOUNTER — OFFICE VISIT (OUTPATIENT)
Dept: PULMONOLOGY | Facility: CLINIC | Age: 38
End: 2023-10-12
Payer: MEDICAID

## 2023-10-12 ENCOUNTER — TELEPHONE (OUTPATIENT)
Dept: PULMONOLOGY | Facility: CLINIC | Age: 38
End: 2023-10-12
Payer: MEDICAID

## 2023-10-12 ENCOUNTER — PATIENT MESSAGE (OUTPATIENT)
Dept: PULMONOLOGY | Facility: CLINIC | Age: 38
End: 2023-10-12

## 2023-10-12 VITALS — HEIGHT: 66 IN | BODY MASS INDEX: 41.78 KG/M2 | WEIGHT: 260 LBS

## 2023-10-12 DIAGNOSIS — G47.33 OSA (OBSTRUCTIVE SLEEP APNEA): Primary | ICD-10-CM

## 2023-10-12 PROCEDURE — 3044F PR MOST RECENT HEMOGLOBIN A1C LEVEL <7.0%: ICD-10-PCS | Mod: CPTII,95,, | Performed by: NURSE PRACTITIONER

## 2023-10-12 PROCEDURE — 1159F MED LIST DOCD IN RCRD: CPT | Mod: CPTII,95,, | Performed by: NURSE PRACTITIONER

## 2023-10-12 PROCEDURE — 3008F PR BODY MASS INDEX (BMI) DOCUMENTED: ICD-10-PCS | Mod: CPTII,95,, | Performed by: NURSE PRACTITIONER

## 2023-10-12 PROCEDURE — 1160F PR REVIEW ALL MEDS BY PRESCRIBER/CLIN PHARMACIST DOCUMENTED: ICD-10-PCS | Mod: CPTII,95,, | Performed by: NURSE PRACTITIONER

## 2023-10-12 PROCEDURE — 3008F BODY MASS INDEX DOCD: CPT | Mod: CPTII,95,, | Performed by: NURSE PRACTITIONER

## 2023-10-12 PROCEDURE — 1160F RVW MEDS BY RX/DR IN RCRD: CPT | Mod: CPTII,95,, | Performed by: NURSE PRACTITIONER

## 2023-10-12 PROCEDURE — 1159F PR MEDICATION LIST DOCUMENTED IN MEDICAL RECORD: ICD-10-PCS | Mod: CPTII,95,, | Performed by: NURSE PRACTITIONER

## 2023-10-12 PROCEDURE — 3044F HG A1C LEVEL LT 7.0%: CPT | Mod: CPTII,95,, | Performed by: NURSE PRACTITIONER

## 2023-10-12 PROCEDURE — 99203 OFFICE O/P NEW LOW 30 MIN: CPT | Mod: 95,,, | Performed by: NURSE PRACTITIONER

## 2023-10-12 PROCEDURE — 99203 PR OFFICE/OUTPT VISIT, NEW, LEVL III, 30-44 MIN: ICD-10-PCS | Mod: 95,,, | Performed by: NURSE PRACTITIONER

## 2023-10-12 NOTE — ASSESSMENT & PLAN NOTE
10/04/2023 PS[facility] Split night study Diagnostic AHI was 63.9/hr ( severe obstructive sleep apnea) 93 events with SpO2 jeannette 84%. Met Split night criteria. CPAP titrated to 10.0cmwp with AHI 1.1/hr.  Patient would like at least auto CPAP since she was on CPAP 20 cm prior that was well tolerated  10/12/2023 order Auto CPAP 10-20 cm   Nasal mask   [facility]UG[facility]IOE: [facility]   Follow up 31-90 days from obtaining PAP therapy for IDL.

## 2023-10-12 NOTE — PROGRESS NOTES
The patient location is: Home in [place]     The chief complaint leading to consultation is: Sleep apnea    Visit type: audiovisual    Face to Face time with patient:   30 minutes of total time spent on the encounter, which includes face to face time and non-face to face time preparing to see the patient (eg, review of tests), Obtaining and/or reviewing separately obtained history, Documenting clinical information in the electronic or other health record, Independently interpreting results (not separately reported) and communicating results to the patient/family/caregiver, or Care coordination (not separately reported).         Each patient to whom he or she provides medical services by telemedicine is:  (1) informed of the relationship between the physician and patient and the respective role of any other health care provider with respect to management of the patient; and (2) notified that he or she may decline to receive medical services by telemedicine and may withdraw from such care at any time.    Notes:       Subjective:      Patient ID: Violet Martinez is a 37 y.o. female.    Chief Complaint: Sleep Apnea    HPI: Violet Martinez telemedicine visit for review PSG split night study ordered by Dr. Llanes.  Patient did not want to wait until 10/30/23 appointment with Dr. Llanes to obtain order for CPAP.  She is not sleeping well since off CPAP over the past month.   She reports prior CPAP setting was CPAP 20 cm with nasal wisp mask.  CPAP was well tolerated with adherence  Malfunction of ResMed CPAP machine obtained at least 10 years ago.   HME: [facility] told patient needed sleep study before she could obtain replacement CPAP machine.  Initial diagnosis at age 20 yrs at [facility].   She needed repeat study to obtain replacement CPAP machine     10/04/2023 PSG Split night study   Conclusion:   1. EKG: Sinus rhythm   2. Diagnostic AHI was 63.9/hr ( severe obstructive sleep apnea) 93 events  with SpO2 jeannette 84%   3. Met Split night criteria   4. CPAP titrated to 10.0cmwp with AHI 1.1/hr   5. Flex: 2   6. Mask & Size: AirFit P10 M Humidity: heated   7. Mild PLMD: Index 6.7/hr during diagnostic that were eliminated after PAP.     Diagnosis: Obstructive Sleep Apnea / 327.23   PLMD   Recommendations:   1. Therapy with CPAP 10.0 cmwp with mask of choice   2. Weight loss     [place] Sleepiness Scale       10/12/2023    11:21 AM 10/2/2023     8:22 AM   EPWORTH SLEEPINESS SCALE   Sitting and reading 3 3   Watching TV 3 3   Sitting, inactive in a public place (e.g. a theatre or a meeting) 3 3   As a passenger in a car for an hour without a break 3 3   Lying down to rest in the afternoon when circumstances permit 3 3   Sitting and talking to someone 3 3   Sitting quietly after a lunch without alcohol 3 3   In a car, while stopped for a few minutes in traffic 3 3   Total score 24 24     Previous Report Reviewed: lab reports and office notes     Past Medical History: The following portions of the patient's history were reviewed and updated as appropriate:   She  has a past surgical history that includes  section and Tubal ligation.  Her family history includes Hypertension in her mother.  She  reports that she has never smoked. She uses smokeless tobacco. She reports current alcohol use. She reports current drug use. Drug: Marijuana.  She currently has no medications in their medication list.  She has No Known Allergies..    The following portions of the patient's history were reviewed and updated as appropriate: allergies, current medications, past family history, past medical history, past social history, past surgical history and problem list.    Review of Systems   Constitutional:  Negative for fever, chills, weight loss, weight gain, activity change, appetite change, fatigue and night sweats.   HENT:  Negative for postnasal drip, rhinorrhea, sinus pressure, voice change and congestion.    Eyes:   "Negative for redness and itching.   Respiratory:  Positive for apnea and snoring. Negative for cough, sputum production, chest tightness, shortness of breath, wheezing, orthopnea, asthma nighttime symptoms, dyspnea on extertion, use of rescue inhaler and somnolence.    Cardiovascular: Negative.  Negative for chest pain, palpitations and leg swelling.   Genitourinary:  Negative for difficulty urinating and hematuria.   Endocrine:  Negative for cold intolerance and heat intolerance.    Musculoskeletal:  Negative for arthralgias, gait problem, joint swelling and myalgias.   Skin: Negative.    Gastrointestinal:  Negative for nausea, vomiting, abdominal pain and acid reflux.   Neurological:  Negative for dizziness, weakness, light-headedness and headaches.   Hematological:  Negative for adenopathy. No excessive bruising.   All other systems reviewed and are negative.     Objective:   Ht 5' 6" (1.676 m)   Wt 117.9 kg (260 lb)   BMI 41.97 kg/m²   Physical Exam  Vitals and nursing note reviewed.   Constitutional:       General: She is awake.      Appearance: Normal appearance. She is well-developed and well-groomed.   HENT:      Head: Normocephalic.   Eyes:      Conjunctiva/sclera: Conjunctivae normal.   Pulmonary:      Effort: Pulmonary effort is normal.   Neurological:      Mental Status: She is alert.   Psychiatric:         Mood and Affect: Mood normal.         Behavior: Behavior normal. Behavior is cooperative.         Thought Content: Thought content normal.         Judgment: Judgment normal.         Personal Diagnostic Review  CPAP download    Assessment:     1. JHON (obstructive sleep apnea)    2. BMI 40.0-44.9, adult        Orders Placed This Encounter   Procedures    CPAP FOR HOME USE     10/04/2023 PSG Split night study Diagnostic AHI was 63.9/hr ( severe obstructive sleep apnea) 93 events with SpO2 jeannette 84%. Met Split night criteria. CPAP titrated to 10.0cmwp with AHI 1.1/hr.    Malfunction of CPAP machine " obtained over 10 years ago. Needs replacement CPAP machine  No lapse in CPAP therapy up until machine no longer would turn on, on about September 9 2023.   [facility], patient would like [facility] to provide replacement CPAP machine if possible.     Order Specific Question:   Length of need (1-99 months):     Answer:   99     Order Specific Question:   Type ():     Answer:   Auto CPAP     Order Specific Question:   Auto CPAP pressure setting range (cmH20):     Answer:   10 - 20     Order Specific Question:   Fulfillment Priority:     Answer:   Level 4:  all others     Order Specific Question:   Humidification ():     Answer:   Heated     Order Specific Question:   Choose ONE mask type and its corresponding cushions and/or pillows:     Answer:    Nasal Mask, 1 per 90 days:  Nasal Cushions, (6 per 90 days):  Nasal Pillows, (6 per 90 days)     Order Specific Question:   Choose EITHER Heated or Non-Heated Tubjing     Answer:    Non-Heated Tubing, 1 per 90 days     Comments:   pt request extra long tubing     Order Specific Question:   Number of Days Needed:     Answer:   99     Order Specific Question:   All other supplies as needed as listed below:     Answer:    Headgear, 1 per 180 days     Order Specific Question:   All other supplies as needed as listed below:     Answer:    Chin Strap, 1 per 180 days     Order Specific Question:   All other supplies as needed as listed below:     Answer:    Disposable Filter, 6 per 90 days     Order Specific Question:   All other supplies as needed as listed below:     Answer:    Non-Disposable Filter, 1 per 180 days     Order Specific Question:   All other supplies as needed as listed below:     Answer:    Humidifier Chamber, 1 per 180 days     Plan:     Problem List Items Addressed This Visit       JHON (obstructive sleep apnea) - Primary     10/04/2023 PSG Split night study Diagnostic AHI was 63.9/hr ( severe  obstructive sleep apnea) 93 events with SpO2 jeannette 84%. Met Split night criteria. CPAP titrated to 10.0cmwp with AHI 1.1/hr.  Patient would like at least auto CPAP since she was on CPAP 20 cm prior that was well tolerated  10/12/2023 order Auto CPAP 10-20 cm   Nasal mask   HME: [facility]   Follow up 31-90 days from obtaining PAP therapy for IDL.            Relevant Orders    CPAP FOR HOME USE    BMI 40.0-44.9, adult     Encouraged calorie reduction and 30 minutes of exercise daily. Discussed impact of obesity on general health.  Lost 30 lbs since July 2023  High weight 317 lbs   Wt Readings from Last 9 Encounters:   10/12/23 117.9 kg (260 lb)   10/02/23 117.9 kg (260 lb)   09/11/23 119.5 kg (263 lb 6.4 oz)   10/21/20 133.5 kg (294 lb 5 oz)   Body mass index is 41.97 kg/m².              I spent a total of 30 minutes on the day of the visit.  This includes face to face time and non-face to face time preparing to see the patient (eg, review of tests), obtaining and/or reviewing separately obtained history, documenting clinical information in the electronic or other health record, independently interpreting results and communicating results to the patient/family/caregiver, or care coordinator.     (DME) - [facility]  Reviewed therapeutic goals for positive airway pressure therapy Auto CPAP  Ideal is usage 100% of nights for 6 - 8 hours per night. Minimum usage is 70% of night for at least 4 hours per night used.     Follow up in about 10 weeks (around 12/21/2023) for CPAP compliance dwld after replacing cpap machine, telemed visit .

## 2023-10-12 NOTE — ASSESSMENT & PLAN NOTE
Encouraged calorie reduction and 30 minutes of exercise daily. Discussed impact of obesity on general health.  Lost 30 lbs since July 2023  High weight 317 lbs   Wt Readings from Last 9 Encounters:   10/12/23 117.9 kg (260 lb)   10/02/23 117.9 kg (260 lb)   09/11/23 119.5 kg (263 lb 6.4 oz)   10/21/20 133.5 kg (294 lb 5 oz)   Body mass index is 41.97 kg/m².

## 2023-10-12 NOTE — TELEPHONE ENCOUNTER
----- Message from Consuelo Rudolph sent at 10/12/2023  7:22 AM CDT -----  Contact: Violet  .Patient is calling to speak with the nurse regarding appt  . Reports needing appt due to the patient needing to come in to go over results and get medication . Please give patient a call back at .[phone]

## 2024-01-12 ENCOUNTER — PATIENT MESSAGE (OUTPATIENT)
Dept: PULMONOLOGY | Facility: CLINIC | Age: 39
End: 2024-01-12
Payer: MEDICAID

## 2024-04-03 ENCOUNTER — PATIENT MESSAGE (OUTPATIENT)
Dept: PULMONOLOGY | Facility: CLINIC | Age: 39
End: 2024-04-03
Payer: MEDICAID

## 2024-12-12 ENCOUNTER — PATIENT MESSAGE (OUTPATIENT)
Dept: RESEARCH | Facility: HOSPITAL | Age: 39
End: 2024-12-12
Payer: MEDICAID

## 2025-04-29 ENCOUNTER — PATIENT MESSAGE (OUTPATIENT)
Dept: PRIMARY CARE CLINIC | Facility: CLINIC | Age: 40
End: 2025-04-29
Payer: MEDICAID

## 2025-05-02 ENCOUNTER — HOSPITAL ENCOUNTER (OUTPATIENT)
Dept: RADIOLOGY | Facility: HOSPITAL | Age: 40
Discharge: HOME | End: 2025-05-02
Attending: NURSE PRACTITIONER
Payer: MEDICAID

## 2025-05-02 ENCOUNTER — PATIENT OUTREACH (OUTPATIENT)
Dept: ADMINISTRATIVE | Facility: OTHER | Age: 40
End: 2025-05-02
Payer: MEDICAID

## 2025-05-02 ENCOUNTER — OFFICE VISIT (OUTPATIENT)
Dept: PRIMARY CARE CLINIC | Facility: CLINIC | Age: 40
End: 2025-05-02
Payer: MEDICAID

## 2025-05-02 VITALS
HEIGHT: 66 IN | OXYGEN SATURATION: 99 % | WEIGHT: 293 LBS | TEMPERATURE: 99 F | SYSTOLIC BLOOD PRESSURE: 122 MMHG | DIASTOLIC BLOOD PRESSURE: 82 MMHG | BODY MASS INDEX: 47.09 KG/M2 | HEART RATE: 86 BPM

## 2025-05-02 DIAGNOSIS — Z11.59 ENCOUNTER FOR HEPATITIS C SCREENING TEST FOR LOW RISK PATIENT: ICD-10-CM

## 2025-05-02 DIAGNOSIS — M25.551 RIGHT HIP PAIN: ICD-10-CM

## 2025-05-02 DIAGNOSIS — Z13.1 SCREENING FOR DIABETES MELLITUS: ICD-10-CM

## 2025-05-02 DIAGNOSIS — Z00.00 GENERAL MEDICAL EXAM: ICD-10-CM

## 2025-05-02 DIAGNOSIS — Z13.6 ENCOUNTER FOR LIPID SCREENING FOR CARDIOVASCULAR DISEASE: ICD-10-CM

## 2025-05-02 DIAGNOSIS — Z13.220 ENCOUNTER FOR LIPID SCREENING FOR CARDIOVASCULAR DISEASE: ICD-10-CM

## 2025-05-02 DIAGNOSIS — M79.604 RIGHT LEG PAIN: Primary | ICD-10-CM

## 2025-05-02 DIAGNOSIS — Z11.4 SCREENING FOR HIV (HUMAN IMMUNODEFICIENCY VIRUS): ICD-10-CM

## 2025-05-02 PROCEDURE — 3074F SYST BP LT 130 MM HG: CPT | Mod: CPTII,,, | Performed by: NURSE PRACTITIONER

## 2025-05-02 PROCEDURE — 99999 PR PBB SHADOW E&M-EST. PATIENT-LVL IV: CPT | Mod: PBBFAC,,, | Performed by: NURSE PRACTITIONER

## 2025-05-02 PROCEDURE — 73502 X-RAY EXAM HIP UNI 2-3 VIEWS: CPT | Mod: TC,PN,RT

## 2025-05-02 PROCEDURE — 99214 OFFICE O/P EST MOD 30 MIN: CPT | Mod: S$PBB,,, | Performed by: NURSE PRACTITIONER

## 2025-05-02 PROCEDURE — 1159F MED LIST DOCD IN RCRD: CPT | Mod: CPTII,,, | Performed by: NURSE PRACTITIONER

## 2025-05-02 PROCEDURE — 3044F HG A1C LEVEL LT 7.0%: CPT | Mod: CPTII,,, | Performed by: NURSE PRACTITIONER

## 2025-05-02 PROCEDURE — 3008F BODY MASS INDEX DOCD: CPT | Mod: CPTII,,, | Performed by: NURSE PRACTITIONER

## 2025-05-02 PROCEDURE — 73502 X-RAY EXAM HIP UNI 2-3 VIEWS: CPT | Mod: 26,RT,, | Performed by: RADIOLOGY

## 2025-05-02 PROCEDURE — 3079F DIAST BP 80-89 MM HG: CPT | Mod: CPTII,,, | Performed by: NURSE PRACTITIONER

## 2025-05-02 PROCEDURE — 99214 OFFICE O/P EST MOD 30 MIN: CPT | Mod: PBBFAC,25,PN | Performed by: NURSE PRACTITIONER

## 2025-05-02 RX ORDER — METHOCARBAMOL 500 MG/1
500 TABLET, FILM COATED ORAL 2 TIMES DAILY PRN
Qty: 20 TABLET | Refills: 3 | Status: SHIPPED | OUTPATIENT
Start: 2025-05-02 | End: 2025-05-12

## 2025-05-02 RX ORDER — LIDOCAINE 50 MG/G
1 PATCH TOPICAL DAILY
COMMUNITY
Start: 2025-04-28 | End: 2025-05-03

## 2025-05-02 RX ORDER — CYCLOBENZAPRINE HCL 10 MG
10 TABLET ORAL 2 TIMES DAILY PRN
COMMUNITY
Start: 2025-04-28

## 2025-05-02 RX ORDER — DICLOFENAC SODIUM 10 MG/G
2 GEL TOPICAL 3 TIMES DAILY PRN
COMMUNITY
Start: 2025-04-28 | End: 2025-05-02

## 2025-05-02 RX ORDER — IBUPROFEN 800 MG/1
800 TABLET ORAL EVERY 8 HOURS PRN
COMMUNITY
Start: 2025-04-28 | End: 2025-05-05

## 2025-05-02 RX ORDER — DICLOFENAC SODIUM 75 MG/1
75 TABLET, DELAYED RELEASE ORAL 2 TIMES DAILY
Qty: 20 TABLET | Refills: 2 | Status: SHIPPED | OUTPATIENT
Start: 2025-05-02 | End: 2025-05-12

## 2025-05-02 NOTE — PROGRESS NOTES
CHW - Initial Contact    This Community Health Worker completed the Social Determinant of Health questionnaire with patient during clinic visit today.    Pt identified barriers of most importance are none at this time.  Referrals to community agencies completed with patient consent outside of [facility] include. No outside referrals at this time.  Referrals were put through [facility] - no  Support and Services: None  Other information discussed the patient needs help with. No other information was discussed.   Follow up required: No  No future outreach task assigned

## 2025-05-05 NOTE — PROGRESS NOTES
Subjective:       Patient ID: Violet Martinez is a 39 y.o. female.    Chief Complaint: Follow-up (Hospital f/u right hip pain ), Hip Pain (Right hip), and Leg Pain (Right leg pain)      History of Present Illness:   Violet Martinez 39 y.o. female presents today with reports of right hip pain and right leg pain. Patient was recently seen in ER for pain. Denies any other problems or concerns at this time. Treatment options and alternatives were discussed with the patient. Patient provided opportunity to ask additional questions.  All questions were answered. Voices understanding and acceptance of this advice. Instructed to call back if any further questions or concerns.              History reviewed. No pertinent past medical history.  Family History   Problem Relation Name Age of Onset    Hypertension Mother       Social History[1]  Encounter Medications[2]    Review of Systems   Constitutional:  Negative for appetite change, chills and fever.   HENT:  Negative for ear pain, sinus pressure, sore throat and trouble swallowing.    Eyes:  Negative for visual disturbance.   Respiratory:  Negative for shortness of breath.    Cardiovascular:  Negative for chest pain.   Gastrointestinal:  Negative for abdominal pain, diarrhea, nausea and vomiting.   Endocrine: Negative for cold intolerance, polyphagia and polyuria.   Genitourinary:  Negative for decreased urine volume and dysuria.   Musculoskeletal:  Negative for arthralgias (right leg and right hip pain) and back pain.   Skin:  Negative for rash.   Allergic/Immunologic: Negative for environmental allergies and food allergies.   Neurological:  Negative for dizziness, tremors, weakness and numbness.   Hematological:  Does not bruise/bleed easily.   Psychiatric/Behavioral:  Negative for confusion and hallucinations. The patient is not nervous/anxious and is not hyperactive.    All other systems reviewed and are negative.      Objective:   Physical Exam  Vitals and  "nursing note reviewed.   Constitutional:       Appearance: She is well-developed.   HENT:      Head: Normocephalic and atraumatic.      Right Ear: External ear normal.      Left Ear: External ear normal.      Nose: Nose normal.   Eyes:      Conjunctiva/sclera: Conjunctivae normal.      Pupils: Pupils are equal, round, and reactive to light.   Cardiovascular:      Rate and Rhythm: Normal rate and regular rhythm.      Heart sounds: Normal heart sounds. No murmur heard.  Pulmonary:      Effort: Pulmonary effort is normal.      Breath sounds: Normal breath sounds. No wheezing.   Abdominal:      General: Bowel sounds are normal.      Palpations: Abdomen is soft.      Tenderness: There is no abdominal tenderness.   Musculoskeletal:      Cervical back: Normal range of motion and neck supple.      Right hip: Laceration and crepitus present. Decreased range of motion.      Right lower leg: Tenderness present.   Skin:     General: Skin is warm and dry.      Findings: No rash.   Neurological:      Mental Status: She is alert and oriented to person, place, and time.      Deep Tendon Reflexes: Reflexes are normal and symmetric.   Psychiatric:         Behavior: Behavior normal.         Thought Content: Thought content normal.         Judgment: Judgment normal.           /82 (BP Location: Left arm, Patient Position: Sitting)   Pulse 86   Temp 98.7 °F (37.1 °C) (Oral)   Ht 5' 6" (1.676 m)   Wt (!) 143.8 kg (317 lb)   LMP 04/25/2025   SpO2 99%   BMI 51.17 kg/m²   Physical Exam               Results for orders placed or performed in visit on 09/11/23   CBC Auto Differential    Collection Time: 09/11/23 11:45 AM   Result Value Ref Range    WBC 12.38 3.90 - 12.70 K/uL    RBC 4.62 4.00 - 5.40 M/uL    Hemoglobin 12.5 12.0 - 16.0 g/dL    Hematocrit 38.9 37.0 - 48.5 %    MCV 84 82 - 98 fL    MCH 27.1 27.0 - 31.0 pg    MCHC 32.1 32.0 - 36.0 g/dL    RDW 15.9 (H) 11.5 - 14.5 %    Platelets 294 150 - 450 K/uL    MPV 10.9 9.2 - " 12.9 fL    Immature Granulocytes 0.2 0.0 - 0.5 %    Gran # (ANC) 7.6 1.8 - 7.7 K/uL    Immature Grans (Abs) 0.03 0.00 - 0.04 K/uL    Lymph # 3.6 1.0 - 4.8 K/uL    Mono # 1.0 0.3 - 1.0 K/uL    Eos # 0.1 0.0 - 0.5 K/uL    Baso # 0.05 0.00 - 0.20 K/uL    nRBC 0 0 /100 WBC    Gran % 61.0 38.0 - 73.0 %    Lymph % 29.1 18.0 - 48.0 %    Mono % 8.3 4.0 - 15.0 %    Eosinophil % 1.0 0.0 - 8.0 %    Basophil % 0.4 0.0 - 1.9 %    Differential Method Automated    Comprehensive Metabolic Panel    Collection Time: 09/11/23 11:45 AM   Result Value Ref Range    Sodium 138 136 - 145 mmol/L    Potassium 3.6 3.5 - 5.1 mmol/L    Chloride 108 95 - 110 mmol/L    CO2 18 (L) 23 - 29 mmol/L    Glucose 76 70 - 110 mg/dL    BUN 10 6 - 20 mg/dL    Creatinine 0.7 0.5 - 1.4 mg/dL    Calcium 9.5 8.7 - 10.5 mg/dL    Total Protein 8.1 6.0 - 8.4 g/dL    Albumin 3.7 3.5 - 5.2 g/dL    Total Bilirubin 0.6 0.1 - 1.0 mg/dL    Alkaline Phosphatase 57 55 - 135 U/L    AST 13 10 - 40 U/L    ALT 8 (L) 10 - 44 U/L    eGFR >60.0 >60 mL/min/1.73 m^2    Anion Gap 12 8 - 16 mmol/L   Lipid Panel    Collection Time: 09/11/23 11:45 AM   Result Value Ref Range    Cholesterol 187 120 - 199 mg/dL    Triglycerides 77 30 - 150 mg/dL    HDL 46 40 - 75 mg/dL    LDL Cholesterol 125.6 63.0 - 159.0 mg/dL    HDL/Cholesterol Ratio 24.6 20.0 - 50.0 %    Total Cholesterol/HDL Ratio 4.1 2.0 - 5.0    Non-HDL Cholesterol 141 mg/dL   Hemoglobin A1C    Collection Time: 09/11/23 11:45 AM   Result Value Ref Range    Hemoglobin A1C 5.1 4.0 - 5.6 %    Estimated Avg Glucose 100 68 - 131 mg/dL   TSH    Collection Time: 09/11/23 11:45 AM   Result Value Ref Range    TSH 3.121 0.400 - 4.000 uIU/mL   T3, Free    Collection Time: 09/11/23 11:45 AM   Result Value Ref Range    T3, Free 3.0 2.3 - 4.2 pg/mL   T4, Free    Collection Time: 09/11/23 11:45 AM   Result Value Ref Range    Free T4 0.96 0.71 - 1.51 ng/dL   HIV 1/2 Ag/Ab (4th Gen)    Collection Time: 09/11/23 11:45 AM   Result Value Ref Range     HIV 1/2 Ag/Ab Non-reactive Non-reactive   Hepatitis C Antibody    Collection Time: 09/11/23 11:45 AM   Result Value Ref Range    Hepatitis C Ab Non-reactive Non-reactive     Assessment:       1. Right leg pain    2. Right hip pain    3. Screening for diabetes mellitus    4. Encounter for lipid screening for cardiovascular disease    5. Encounter for hepatitis C screening test for low risk patient    6. General medical exam    7. Screening for HIV (human immunodeficiency virus)    Assessment & Plan             Plan:   Right leg pain  -     CBC Auto Differential; Future; Expected date: 05/02/2025  -     Uric Acid; Future; Expected date: 05/02/2025  -     Ambulatory Referral/Consult to Physical Therapy  -     Comprehensive Metabolic Panel; Future; Expected date: 05/02/2025  -     Rheumatoid Factor; Future; Expected date: 05/02/2025  -     HEVER Screen w/Reflex; Future; Expected date: 05/02/2025  -     C3 Complement; Future; Expected date: 05/02/2025  -     C4 Complement; Future; Expected date: 05/02/2025  -     Sedimentation rate; Future; Expected date: 05/02/2025  -     C-Reactive Protein; Future; Expected date: 05/02/2025  -     Anti-Smith Antibody; Future; Expected date: 05/02/2025  -     diclofenac (VOLTAREN) 75 MG EC tablet; Take 1 tablet (75 mg total) by mouth 2 (two) times daily. for 10 days  Dispense: 20 tablet; Refill: 2  -     methocarbamoL (ROBAXIN) 500 MG Tab; Take 1 tablet (500 mg total) by mouth 2 (two) times daily as needed (spasms).  Dispense: 20 tablet; Refill: 3    Right hip pain  -     Ambulatory Referral/Consult to Physical Therapy  -     X-Ray Hip 2 or 3 views Right with Pelvis when performed; Future; Expected date: 05/02/2025    Screening for diabetes mellitus  -     Hemoglobin A1C; Future; Expected date: 05/02/2025  -     TSH; Future; Expected date: 05/02/2025  -     T3, Free; Future; Expected date: 05/02/2025  -     T4, Free; Future; Expected date: 05/02/2025    Encounter for lipid screening  for cardiovascular disease  -     Lipid Panel; Future; Expected date: 05/02/2025    Encounter for hepatitis C screening test for low risk patient  -     Hepatitis C Antibody; Future; Expected date: 05/02/2025    General medical exam  -     Comprehensive Metabolic Panel; Future; Expected date: 05/02/2025  -     Lipid Panel; Future; Expected date: 05/02/2025  -     CBC Auto Differential; Future; Expected date: 05/02/2025  -     Comprehensive Metabolic Panel; Future; Expected date: 05/02/2025  -     Rheumatoid Factor; Future; Expected date: 05/02/2025  -     HEVER Screen w/Reflex; Future; Expected date: 05/02/2025    Screening for HIV (human immunodeficiency virus)  -     HIV 1/2 Ag/Ab (4th Gen); Future; Expected date: 05/02/2025      Today's encounter took a total time of 20 minutes, and that time included Preparing to see the patient (review records, tests), Obtaining and/or reviewing separately obtained historical data, Performing a medically appropriate examination and/or evaluation , Counseling & educating the patient/family/caregiver on treatment plan with chronic health conditions , ordering medications, tests, and/or procedures, Referring and communicating with other healthcare professionals , Documenting clinical information in the electronic or other health record, Independently interpreting results & communicating results to the patient/family/caregiver.           [facility] [address] [place] [id number]  Office [phone]  Fax [phone]   This note was generated with the assistance of ambient listening technology. Verbal consent was obtained by the patient and accompanying visitor(s) for the recording of patient appointment to facilitate this note. I attest to having reviewed and edited the generated note for accuracy, though some syntax or spelling errors may persist. Please contact the author of this note for any clarification.              [1]    Social History  Socioeconomic History    Marital status: Single   Tobacco Use    Smoking status: Never    Smokeless tobacco: Current    Tobacco comments:     vape   Substance and Sexual Activity    Alcohol use: Yes    Drug use: Yes     Types: Marijuana    Sexual activity: Yes     Social Drivers of Health     Financial Resource Strain: Low Risk  (2025)    Overall Financial Resource Strain (CARDIA)     Difficulty of Paying Living Expenses: Not hard at all   Food Insecurity: No Food Insecurity (2025)    Hunger Vital Sign     Worried About Running Out of Food in the Last Year: Never true     Ran Out of Food in the Last Year: Never true   Transportation Needs: No Transportation Needs (2025)    PRAPARE - Transportation     Lack of Transportation (Medical): No     Lack of Transportation (Non-Medical): No   Physical Activity: Sufficiently Active (2025)    Exercise Vital Sign     Days of Exercise per Week: 5 days     Minutes of Exercise per Session: 150+ min   Stress: No Stress Concern Present (2025)    [employer] [place] [employer] - Occupational Stress Questionnaire     Feeling of Stress : Only a little   Housing Stability: Low Risk  (2025)    Housing Stability Vital Sign     Unable to Pay for Housing in the Last Year: No     Number of Times Moved in the Last Year: 0     Homeless in the Last Year: No   [2]   Outpatient Encounter Medications as of 2025   Medication Sig Dispense Refill    cyclobenzaprine (FLEXERIL) 10 MG tablet Take 10 mg by mouth 2 (two) times daily as needed.      ibuprofen (ADVIL,MOTRIN) 800 MG tablet Take 800 mg by mouth every 8 (eight) hours as needed.      [] LIDOcaine (LIDODERM) 5 % Apply 1 patch topically once daily.      [DISCONTINUED] diclofenac sodium (VOLTAREN) 1 % Gel Apply 2 g topically 3 (three) times daily as needed.      diclofenac (VOLTAREN) 75 MG EC tablet Take 1 tablet (75 mg total) by mouth 2 (two) times daily. for 10 days 20 tablet 2     methocarbamoL (ROBAXIN) 500 MG Tab Take 1 tablet (500 mg total) by mouth 2 (two) times daily as needed (spasms). 20 tablet 3     No facility-administered encounter medications on file as of 5/2/2025.

## 2025-05-06 ENCOUNTER — RESULTS FOLLOW-UP (OUTPATIENT)
Dept: PRIMARY CARE CLINIC | Facility: CLINIC | Age: 40
End: 2025-05-06

## 2025-05-19 ENCOUNTER — LAB VISIT (OUTPATIENT)
Dept: LAB | Facility: HOSPITAL | Age: 40
End: 2025-05-19
Attending: PHYSICIAN ASSISTANT
Payer: MEDICAID

## 2025-05-19 DIAGNOSIS — Z00.00 GENERAL MEDICAL EXAM: ICD-10-CM

## 2025-05-19 DIAGNOSIS — M79.604 RIGHT LEG PAIN: ICD-10-CM

## 2025-05-19 LAB
ABSOLUTE EOSINOPHIL (OHS): 0.04 K/UL
ABSOLUTE MONOCYTE (OHS): 0.59 K/UL (ref 0.3–1)
ABSOLUTE NEUTROPHIL COUNT (OHS): 4.2 K/UL (ref 1.8–7.7)
ALBUMIN SERPL BCP-MCNC: 3.6 G/DL (ref 3.5–5.2)
ALP SERPL-CCNC: 56 UNIT/L (ref 40–150)
ALT SERPL W/O P-5'-P-CCNC: 12 UNIT/L (ref 10–44)
ANION GAP (OHS): 12 MMOL/L (ref 8–16)
AST SERPL-CCNC: 18 UNIT/L (ref 11–45)
BASOPHILS # BLD AUTO: 0.03 K/UL
BASOPHILS NFR BLD AUTO: 0.4 %
BILIRUB SERPL-MCNC: 0.4 MG/DL (ref 0.1–1)
BUN SERPL-MCNC: 5 MG/DL (ref 6–20)
CALCIUM SERPL-MCNC: 9 MG/DL (ref 8.7–10.5)
CHLORIDE SERPL-SCNC: 109 MMOL/L (ref 95–110)
CO2 SERPL-SCNC: 18 MMOL/L (ref 23–29)
CREAT SERPL-MCNC: 0.7 MG/DL (ref 0.5–1.4)
ERYTHROCYTE [DISTWIDTH] IN BLOOD BY AUTOMATED COUNT: 14.9 % (ref 11.5–14.5)
GFR SERPLBLD CREATININE-BSD FMLA CKD-EPI: >60 ML/MIN/1.73/M2
GLUCOSE SERPL-MCNC: 88 MG/DL (ref 70–110)
HCT VFR BLD AUTO: 40.8 % (ref 37–48.5)
HGB BLD-MCNC: 13.2 GM/DL (ref 12–16)
IMM GRANULOCYTES # BLD AUTO: 0.05 K/UL (ref 0–0.04)
IMM GRANULOCYTES NFR BLD AUTO: 0.7 % (ref 0–0.5)
LYMPHOCYTES # BLD AUTO: 2.45 K/UL (ref 1–4.8)
MCH RBC QN AUTO: 27.8 PG (ref 27–31)
MCHC RBC AUTO-ENTMCNC: 32.4 G/DL (ref 32–36)
MCV RBC AUTO: 86 FL (ref 82–98)
NUCLEATED RBC (/100WBC) (OHS): 0 /100 WBC
PLATELET # BLD AUTO: 240 K/UL (ref 150–450)
PMV BLD AUTO: 11.7 FL (ref 9.2–12.9)
POTASSIUM SERPL-SCNC: 3.5 MMOL/L (ref 3.5–5.1)
PROT SERPL-MCNC: 8 GM/DL (ref 6–8.4)
RBC # BLD AUTO: 4.74 M/UL (ref 4–5.4)
RELATIVE EOSINOPHIL (OHS): 0.5 %
RELATIVE LYMPHOCYTE (OHS): 33.3 % (ref 18–48)
RELATIVE MONOCYTE (OHS): 8 % (ref 4–15)
RELATIVE NEUTROPHIL (OHS): 57.1 % (ref 38–73)
SODIUM SERPL-SCNC: 139 MMOL/L (ref 136–145)
WBC # BLD AUTO: 7.36 K/UL (ref 3.9–12.7)

## 2025-05-19 PROCEDURE — 85025 COMPLETE CBC W/AUTO DIFF WBC: CPT

## 2025-05-19 PROCEDURE — 36415 COLL VENOUS BLD VENIPUNCTURE: CPT | Mod: PN

## 2025-05-19 PROCEDURE — 80053 COMPREHEN METABOLIC PANEL: CPT

## 2025-05-26 ENCOUNTER — CLINICAL SUPPORT (OUTPATIENT)
Dept: REHABILITATION | Facility: HOSPITAL | Age: 40
End: 2025-05-26
Payer: MEDICAID

## 2025-05-26 DIAGNOSIS — R53.1 WEAKNESS: Primary | ICD-10-CM

## 2025-05-26 PROCEDURE — 97110 THERAPEUTIC EXERCISES: CPT | Mod: PN

## 2025-05-26 PROCEDURE — 97161 PT EVAL LOW COMPLEX 20 MIN: CPT | Mod: PN

## 2025-05-26 NOTE — PROGRESS NOTES
Outpatient Rehab    Physical Therapy Evaluation    Patient Name: Violet Martinez  MRN: [id number]  YOB: 1985  Encounter Date: 5/26/2025    Therapy Diagnosis:   Encounter Diagnosis   Name Primary?    Weakness Yes     Physician: Wanda Mcclure*    Physician Orders: Eval and Treat  Medical Diagnosis: Right leg pain  Right hip pain    Visit # / Visits Authorized:  1 / 1  Insurance Authorization Period: 5/2/2025 to 5/2/2026  Date of Evaluation: 5/26/2025  Plan of Care Certification: 5/26/2025 to 7/8/2025     Time In: 1530   Time Out: 1630  Total Time (in minutes): 60   Total Billable Time (in minutes):  60 minutes 1:1 with PT     Intake Outcome Measure for FOTO Survey    Therapist reviewed FOTO scores for Violet Martinez on 5/26/2025.   FOTO report - see Media section or FOTO account episode details.     Intake Score: 43%         Subjective   History of Present Illness  Violet is a 39 y.o. female who reports to physical therapy with a chief concern of R sided LE and back pain.                 History of Present Condition/Illness: Patient presents with c/o right-sided hip and anterior thigh pain that onset one night while she was asleep about two month ago. Patient reports pain has slowly improved over time and she has not had R lower extremity pain in two weeks. Pt reports she feels more pain in her back at this point. Pt notes come and go back pain at night has been present for years. Pt states she does get numbness/ tingling into the R lower extremity down to her foot, intermittently. Pt reports sitting and laying were giving her the most pain in her R lower extremity, but relief was found for a period with laying down on the right side itself. Nonpainful popping/ clicking is reportedly felt in the R hip when patient raises and lowers her R lower extremity while laying down. It is noted patient did fall onto her back recently backwards from a decent akash. Pt notes she did not have  any pain onset or increase after, so she did not think it was related.     Pain     Patient reports a current pain level of 0/10.  Pain at worst is reported as 10/10.   Location: R LE  Pain Qualities: Aching, Sharp  Pain-Relieving Factors: Heat, Medications - prescription, Change in position  Pain-Aggravating Factors: Driving, Bending, Lifting, Sitting, Lying down  Low back pain is reported to be 7/10 currently, 5/10 at best and 10/10 at worst. This pain is mainly present at night.       Living Arrangements  Living Situation  Living Arrangements: Family members    Home Setup  Number of Levels in Home: One level        Employment  Employment Status: Not employed          Past Medical History/Physical Systems Review:   Violet Martinez  has no past medical history on file.    Violet Martinez  has a past surgical history that includes  section and Tubal ligation.    Violet has a current medication list which includes the following prescription(s): cyclobenzaprine.    Review of patient's allergies indicates:  No Known Allergies     Objective      RANGE OF MOTION:    Lumbar AROM   (% of norm ROM present)  Right  (spine) Left   Pain/Dysfunction with Movement   Lumbar Flexion  100% --- Pain increase coming out of position   Lumbar Extension  100% --- Pain increase coming out of position   Lumbar Side Bending  100% 100% Increased R sided back pain with L side bend   Lumbar Rotation 100% 100% Increased R sided back pain with L side bend     Hip AROM/PROM Right Left   Hip Flexion (120) 110 110   Hip Abduction (45) 40 40   Hip IR (45) 40 45   Hip ER (45) 45 45       STRENGTH:    L/E MMT Right Left Goal   Hip Flexion  4/5 4/5 5/5 B   Hip Extension  4-/5 4-/5 5/5 B   Hip Abduction  4/5 4/5 5/5 B   Knee Extension 4+/5 4+/5 ---   Knee Flexion 5/5 5/5 ---   Hip IR 5/5 4/5 ---   Hip ER 4/5 4/5 5/5 B       MUSCLE LENGTH:     Muscle Tested  Right   Hamstrings  decreased   Piriformis  decreased     SPECIAL TESTS:      Right  (spine)   SLR Negative     Palpation:TTP at R PSIS, glutes and lumbar spinous processes at L5/S1    Posture:  Pt presents with postural abnormalities which include: forward head, rounded shoulders , increased lumbar lordosis, and anterior pelvic tilt    Movement Analysis Observations noted   Plank Hold  27 seconds, mod shaking before fatigue causes drop       Balance  Right (spine) Pain/dysfunction noted   Single Leg Stance 21 seconds R: increased sway with time with use of upper extremities to prevent LOB         Treatment: (15 minutes)   Therapeutic Activity  TA 1: HEP and POC education       Assessment & Plan   Assessment  Violet presents with a condition of Low complexity.   Presentation of Symptoms: Stable  Will Comorbidities Impact Care: Yes  See patient co-morbidities listed in patient past medical history above in subjective section of evaluation      Functional Limitations: Activity tolerance, Completing self-care activities, Decreased ambulation distance/endurance, Disrupted sleep pattern, Driving, Functional mobility, Maintaining balance, Pain with ADLs/IADLs, Painful locomotion/ambulation, Performing household chores, Sitting tolerance, Squatting  Impairments: Abnormal muscle firing, Abnormal or restricted range of motion, Activity intolerance, Impaired balance, Impaired physical strength, Pain with functional activity, Lack of appropriate home exercise program    Patient Goal for Therapy (PT): to decrease occurance of pain and improve function  Prognosis: Good  Assessment Details: Patient is a 39 year old female presenting to outpatient physical therapy with diagnosed right leg and hip pain. Patient presents with deficits in core stability/ endurance, flexibility, strength, posture and balance. Patient is being limited with tolerance to prolonged positions, sleep function, and higher level activities of daily living around home in relation to deficits and pain noted.      Plan  From a physical  therapy perspective, the patient would benefit from: Skilled Rehab Services    Planned therapy interventions include: Therapeutic exercise, Therapeutic activities, Neuromuscular re-education, Manual therapy, ADLs/IADLs, Aquatic therapy, Canalith repositioning, Orthotic management and training, Lymphatic compression wrapping, Group therapy, Gait training, Community/work reintegration, Wheelchair management, Work conditioning, Wound care, and Other (Comment). Dry Needling   Planned modalities to include: Biofeedback, Cryotherapy (cold pack), Electrical stimulation - attended, Electrical stimulation - passive/unattended, Mechanical traction, Thermotherapy (hot pack), and Ultrasound.        Visit Frequency: 2 times Per Week for 6 Weeks.       This plan was discussed with Patient.   Discussion participants: Agreed Upon Plan of Care         The patient's spiritual, cultural, and educational needs were considered, and the patient is agreeable to the plan of care and goals.     Education             No show policy, HEP, plan of care, PT role and benefits        Goals:   Active       Functional outcome       Patient will show a significant change in FOTO score to 60 or better to demonstrate subjective improvement in overall function.        Start:  05/27/25    Expected End:  07/08/25            Patient will demonstrate independence in home program for support of progression       Start:  05/27/25    Expected End:  06/17/25               Maintaining body position       Patient will maintain plank on elbows position for 45 seconds with mild to no shaking to demonstrate improved postural tolerance and core endurance.        Start:  05/27/25    Expected End:  07/08/25               Pain       Patient will report pain of 5/10 at worst demonstrating a reduction of overall pain       Start:  05/27/25    Expected End:  06/17/25               Strength       Patient will demonstrate strength improvements to stated goals in objective  section of evaluation.        Start:  05/27/25    Expected End:  07/08/25                Zoe Schumacher PT

## 2025-05-28 ENCOUNTER — CLINICAL SUPPORT (OUTPATIENT)
Dept: REHABILITATION | Facility: HOSPITAL | Age: 40
End: 2025-05-28
Payer: MEDICAID

## 2025-05-28 DIAGNOSIS — R53.1 WEAKNESS: Primary | ICD-10-CM

## 2025-05-28 PROCEDURE — 97110 THERAPEUTIC EXERCISES: CPT | Mod: PN

## 2025-05-28 NOTE — PROGRESS NOTES
"  Outpatient Rehab    Physical Therapy Visit    Patient Name: Violet Martinez  MRN: [id number]  YOB: 1985  Encounter Date: 5/28/2025    Therapy Diagnosis:   Encounter Diagnosis   Name Primary?    Weakness Yes     Physician: Wanda Mcclure*    Physician Orders: Eval and Treat  Medical Diagnosis: Right leg pain  Right hip pain    Visit # / Visits Authorized:  1 / 20  Insurance Authorization Period: 5/10/2025 to 5/10/2026  Date of Evaluation: 5/26/2025  Plan of Care Certification: 5/27/2025 to 7/8/2025      Time In: 0835   Time Out: 0930  Total Time (in minutes): 55   Total Billable Time (in minutes):  55 minutes     FOTO: 1/3      Subjective   Patient reports no pain today and good tolerance of HEP.  Pain reported as 0/10.      Objective    To be reassessed at next progress note/ plan of care update         Treatment:         CPT Intervention  JointFocus Duration / Intensity  5/28/2025   TE Recumbent Bike   6 minutes    NMR Piriformis stretch  3 x 15"    NMR Seated nerve glides X 10 R   NMR PPT  3 x 10 5" holds    NMR  PPT+ Bridge   2 x 10    NMR PPT + marches  X 15    NMR  SL clamshells   RTB  x8 8" holds R    NMR  SLR (up, left ,right ,down)  2 x 10 R   NMR  SL hip abduction with 5 pulses  X 15   NMR Mod Planks holds   3 x 15"    NMR  Mod Side planks   3 x10" R   NMR Straight arm pulldowns  Green cord x 15 5" holds    NMR Pallof press  Green cord x 15 5" holds   PLAN          CPT Codes available for Billing:   (-) minutes of Manual therapy (MT) to improve pain and ROM.  (55) minutes of Therapeutic Exercise (TE) to develop strength, endurance, range of motion, and flexibility.  (-) minutes of Neuromuscular Re-Education (NMR)  to improve: Balance, Coordination, Kinesthetic, Sense, Proprioception, and Posture.  (-) minutes of Therapeutic Activities (TA) to improve functional performance.  (-) minutes of Gait Training (GT) to improve functional mobility and safety  Unattended Electrical " Stimulation (ES) for muscle performance or pain modulation.  Vasopneumatic Device Therapy () for management of swelling/edema. (26556)  BFR: Blood flow restriction applied during exercise  NP or (-): Not Performed       Assessment & Plan   Assessment: Patient presents no symptom complaints today in the back or hip. Treatment done focusing on improving motor control and postural muscle activation at trunk/ hip. Cues were needed throughout to promote proper activation patterns without compensation and correct technique. Pt tolerates this well with noted fatigue by end of session, but no pain.       The patient will continue to benefit from skilled outpatient physical therapy in order to address the deficits listed in the problem list on the initial evaluation, provide patient and family education, and maximize the patients level of independence in the home and community environments.     The patient's spiritual, cultural, and educational needs were considered, and the patient is agreeable to the plan of care and goals.           Plan: Plan to continue with progressions per tolerance within POC    Goals:   Active       Functional outcome       Patient will show a significant change in FOTO score to 60 or better to demonstrate subjective improvement in overall function.        Start:  05/27/25    Expected End:  07/08/25            Patient will demonstrate independence in home program for support of progression       Start:  05/27/25    Expected End:  06/17/25               Maintaining body position       Patient will maintain plank on elbows position for 45 seconds with mild to no shaking to demonstrate improved postural tolerance and core endurance.        Start:  05/27/25    Expected End:  07/08/25               Pain       Patient will report pain of 5/10 at worst demonstrating a reduction of overall pain       Start:  05/27/25    Expected End:  06/17/25               Strength       Patient will demonstrate  strength improvements to stated goals in objective section of evaluation.        Start:  05/27/25    Expected End:  07/08/25                Zoe Schumacher PT

## 2025-06-02 ENCOUNTER — OFFICE VISIT (OUTPATIENT)
Dept: PRIMARY CARE CLINIC | Facility: CLINIC | Age: 40
End: 2025-06-02
Payer: MEDICAID

## 2025-06-02 VITALS
HEART RATE: 77 BPM | HEIGHT: 66 IN | WEIGHT: 293 LBS | DIASTOLIC BLOOD PRESSURE: 80 MMHG | TEMPERATURE: 99 F | OXYGEN SATURATION: 99 % | BODY MASS INDEX: 47.09 KG/M2 | SYSTOLIC BLOOD PRESSURE: 110 MMHG

## 2025-06-02 DIAGNOSIS — F51.01 PRIMARY INSOMNIA: ICD-10-CM

## 2025-06-02 DIAGNOSIS — H60.22 MALIGNANT OTITIS EXTERNA OF LEFT EAR, UNSPECIFIED CHRONICITY: Primary | ICD-10-CM

## 2025-06-02 PROCEDURE — 99214 OFFICE O/P EST MOD 30 MIN: CPT | Mod: S$PBB,,, | Performed by: NURSE PRACTITIONER

## 2025-06-02 PROCEDURE — 3074F SYST BP LT 130 MM HG: CPT | Mod: CPTII,,, | Performed by: NURSE PRACTITIONER

## 2025-06-02 PROCEDURE — 3008F BODY MASS INDEX DOCD: CPT | Mod: CPTII,,, | Performed by: NURSE PRACTITIONER

## 2025-06-02 PROCEDURE — 99213 OFFICE O/P EST LOW 20 MIN: CPT | Mod: PBBFAC,PN | Performed by: NURSE PRACTITIONER

## 2025-06-02 PROCEDURE — 1159F MED LIST DOCD IN RCRD: CPT | Mod: CPTII,,, | Performed by: NURSE PRACTITIONER

## 2025-06-02 PROCEDURE — 99999 PR PBB SHADOW E&M-EST. PATIENT-LVL III: CPT | Mod: PBBFAC,,, | Performed by: NURSE PRACTITIONER

## 2025-06-02 PROCEDURE — 1160F RVW MEDS BY RX/DR IN RCRD: CPT | Mod: CPTII,,, | Performed by: NURSE PRACTITIONER

## 2025-06-02 PROCEDURE — 3079F DIAST BP 80-89 MM HG: CPT | Mod: CPTII,,, | Performed by: NURSE PRACTITIONER

## 2025-06-02 PROCEDURE — 3044F HG A1C LEVEL LT 7.0%: CPT | Mod: CPTII,,, | Performed by: NURSE PRACTITIONER

## 2025-06-02 RX ORDER — CIPROFLOXACIN AND DEXAMETHASONE 3; 1 MG/ML; MG/ML
4 SUSPENSION/ DROPS AURICULAR (OTIC) 2 TIMES DAILY
Qty: 7.5 ML | Refills: 2 | Status: SHIPPED | OUTPATIENT
Start: 2025-06-02 | End: 2025-06-12

## 2025-06-02 RX ORDER — HYDROXYZINE PAMOATE 25 MG/1
25 CAPSULE ORAL NIGHTLY PRN
Qty: 30 CAPSULE | Refills: 3 | Status: SHIPPED | OUTPATIENT
Start: 2025-06-02 | End: 2025-07-02

## 2025-06-16 ENCOUNTER — TELEPHONE (OUTPATIENT)
Dept: REHABILITATION | Facility: HOSPITAL | Age: 40
End: 2025-06-16
Payer: MEDICAID

## 2025-06-30 NOTE — PROGRESS NOTES
Subjective:       Patient ID: Violet Martinez is a 39 y.o. female.    Chief Complaint: Follow-up (Discuss labs), Referral (Requesting ENT referral ), and Insomnia (Difficulty sleeping at night)      History of Present Illness:   Violet Martinez 39 y.o. female presents today with the following;  History of Present Illness    CHIEF COMPLAINT:  Ms. Martinez presents today for earwax removal    SLEEP:  She reports difficulty sleeping and is requesting sleep aid medication.        History reviewed. No pertinent past medical history.  Family History   Problem Relation Name Age of Onset    Hypertension Mother       Social History[1]  Encounter Medications[2]    Review of Systems   Constitutional:  Negative for appetite change, chills and fever.   HENT:  Positive for ear pain. Negative for sinus pressure, sore throat and trouble swallowing.    Eyes:  Negative for visual disturbance.   Respiratory:  Negative for shortness of breath.    Cardiovascular:  Negative for chest pain.   Gastrointestinal:  Negative for abdominal pain, diarrhea, nausea and vomiting.   Endocrine: Negative for cold intolerance, polyphagia and polyuria.   Genitourinary:  Negative for decreased urine volume and dysuria.   Musculoskeletal:  Negative for back pain.   Skin:  Negative for rash.   Allergic/Immunologic: Negative for environmental allergies and food allergies.   Neurological:  Negative for dizziness, tremors, weakness and numbness.   Hematological:  Does not bruise/bleed easily.   Psychiatric/Behavioral:  Positive for sleep disturbance. Negative for confusion and hallucinations. The patient is not nervous/anxious and is not hyperactive.    All other systems reviewed and are negative.      Objective:   Physical Exam  Constitutional:       Appearance: She is well-developed. She is obese.   HENT:      Head: Normocephalic.      Right Ear: Tympanic membrane normal.      Left Ear: Tympanic membrane normal.      Nose: Nose normal.   Eyes:      " Pupils: Pupils are equal, round, and reactive to light.   Cardiovascular:      Rate and Rhythm: Normal rate.      Heart sounds: Normal heart sounds.   Pulmonary:      Effort: Pulmonary effort is normal.      Breath sounds: Normal breath sounds.   Abdominal:      General: Bowel sounds are normal.      Palpations: Abdomen is soft.   Musculoskeletal:         General: Normal range of motion.   Skin:     General: Skin is warm and dry.   Neurological:      Mental Status: She is alert and oriented to person, place, and time.   Psychiatric:         Behavior: Behavior normal.           /80 (BP Location: Right arm, Patient Position: Sitting)   Pulse 77   Temp 99.2 °F (37.3 °C) (Oral)   Ht 5' 6" (1.676 m)   Wt (!) 137.4 kg (303 lb)   LMP 05/02/2025   SpO2 99%   BMI 48.91 kg/m²   Physical Exam               Results for orders placed or performed in visit on 05/19/25   Comprehensive Metabolic Panel    Collection Time: 05/19/25 10:30 AM   Result Value Ref Range    Sodium 139 136 - 145 mmol/L    Potassium 3.5 3.5 - 5.1 mmol/L    Chloride 109 95 - 110 mmol/L    CO2 18 (L) 23 - 29 mmol/L    Glucose 88 70 - 110 mg/dL    BUN 5 (L) 6 - 20 mg/dL    Creatinine 0.7 0.5 - 1.4 mg/dL    Calcium 9.0 8.7 - 10.5 mg/dL    Protein Total 8.0 6.0 - 8.4 gm/dL    Albumin 3.6 3.5 - 5.2 g/dL    Bilirubin Total 0.4 0.1 - 1.0 mg/dL    ALP 56 40 - 150 unit/L    AST 18 11 - 45 unit/L    ALT 12 10 - 44 unit/L    Anion Gap 12 8 - 16 mmol/L    eGFR >60 >60 mL/min/1.73/m2   CBC with Differential    Collection Time: 05/19/25 10:30 AM   Result Value Ref Range    WBC 7.36 3.90 - 12.70 K/uL    RBC 4.74 4.00 - 5.40 M/uL    HGB 13.2 12.0 - 16.0 gm/dL    HCT 40.8 37.0 - 48.5 %    MCV 86 82 - 98 fL    MCH 27.8 27.0 - 31.0 pg    MCHC 32.4 32.0 - 36.0 g/dL    RDW 14.9 (H) 11.5 - 14.5 %    Platelet Count 240 150 - 450 K/uL    MPV 11.7 9.2 - 12.9 fL    Nucleated RBC 0 <=0 /100 WBC    Neut % 57.1 38 - 73 %    Lymph % 33.3 18 - 48 %    Mono % 8.0 4 - 15 %    " Eos % 0.5 <=8 %    Basophil % 0.4 <=1.9 %    Imm Grans % 0.7 (H) 0.0 - 0.5 %    Neut # 4.20 1.8 - 7.7 K/uL    Lymph # 2.45 1 - 4.8 K/uL    Mono # 0.59 0.3 - 1 K/uL    Eos # 0.04 <=0.5 K/uL    Baso # 0.03 <=0.2 K/uL    Imm Grans # 0.05 (H) 0.00 - 0.04 K/uL     Assessment:       1. Malignant otitis externa of left ear, unspecified chronicity    2. Primary insomnia    Assessment & Plan    H61.23 Impacted cerumen, bilateral  G47.01 Insomnia due to medical condition    IMPACTED CERUMEN:  - Performed bilateral cerumen removal procedure to address impaction.  - Moderate amount of cerumen was removed from both ears.  - Ms. Martinez reports improved hearing following the procedure.    INSOMNIA:  - Started Vistaril 20 mg daily to address reported sleep issues.        Plan:   Malignant otitis externa of left ear, unspecified chronicity  -     ciprofloxacin-dexAMETHasone 0.3-0.1% (CIPRODEX) 0.3-0.1 % DrpS; Place 4 drops into both ears 2 (two) times daily. for 10 days  Dispense: 7.5 mL; Refill: 2    Primary insomnia  -     hydrOXYzine pamoate (VISTARIL) 25 MG Cap; Take 1 capsule (25 mg total) by mouth nightly as needed (anxiety/sleep).  Dispense: 30 capsule; Refill: 3      Today's encounter took a total time of 20 minutes, and that time included Preparing to see the patient (review records, tests), Obtaining and/or reviewing separately obtained historical data, Performing a medically appropriate examination and/or evaluation , Counseling & educating the patient/family/caregiver on treatment plan with chronic health conditions , ordering medications, tests, and/or procedures, Referring and communicating with other healthcare professionals , Documenting clinical information in the electronic or other health record, Independently interpreting results & communicating results to the patient/family/caregiver.           [facility]   [address]  [place] [address]  Office  [phone]  Fax [phone]   This note was generated with the assistance of ambient listening technology. Verbal consent was obtained by the patient and accompanying visitor(s) for the recording of patient appointment to facilitate this note. I attest to having reviewed and edited the generated note for accuracy, though some syntax or spelling errors may persist. Please contact the author of this note for any clarification.            [1]   Social History  Socioeconomic History    Marital status: Single   Tobacco Use    Smoking status: Never    Smokeless tobacco: Current    Tobacco comments:     vape   Substance and Sexual Activity    Alcohol use: Yes    Drug use: Yes     Types: Marijuana    Sexual activity: Yes     Social Drivers of Health     Financial Resource Strain: Low Risk  (2025)    Overall Financial Resource Strain (CARDIA)     Difficulty of Paying Living Expenses: Not hard at all   Food Insecurity: No Food Insecurity (2025)    Hunger Vital Sign     Worried About Running Out of Food in the Last Year: Never true     Ran Out of Food in the Last Year: Never true   Transportation Needs: No Transportation Needs (2025)    PRAPARE - Transportation     Lack of Transportation (Medical): No     Lack of Transportation (Non-Medical): No   Physical Activity: Sufficiently Active (2025)    Exercise Vital Sign     Days of Exercise per Week: 5 days     Minutes of Exercise per Session: 150+ min   Stress: No Stress Concern Present (2025)    [employer] - Occupational Stress Questionnaire     Feeling of Stress : Only a little   Housing Stability: Low Risk  (2025)    Housing Stability Vital Sign     Unable to Pay for Housing in the Last Year: No     Number of Times Moved in the Last Year: 0     Homeless in the Last Year: No   [2]   Outpatient Encounter Medications as of 2025   Medication Sig Dispense Refill    [] ciprofloxacin-dexAMETHasone 0.3-0.1% (CIPRODEX)  0.3-0.1 % DrpS Place 4 drops into both ears 2 (two) times daily. for 10 days 7.5 mL 2    cyclobenzaprine (FLEXERIL) 10 MG tablet Take 10 mg by mouth 2 (two) times daily as needed. (Patient not taking: Reported on 2025)      [] diclofenac (VOLTAREN) 75 MG EC tablet Take 1 tablet (75 mg total) by mouth 2 (two) times daily. for 10 days 20 tablet 2    hydrOXYzine pamoate (VISTARIL) 25 MG Cap Take 1 capsule (25 mg total) by mouth nightly as needed (anxiety/sleep). 30 capsule 3    [] ibuprofen (ADVIL,MOTRIN) 800 MG tablet Take 800 mg by mouth every 8 (eight) hours as needed.      [] methocarbamoL (ROBAXIN) 500 MG Tab Take 1 tablet (500 mg total) by mouth 2 (two) times daily as needed (spasms). 20 tablet 3     No facility-administered encounter medications on file as of 2025.